# Patient Record
Sex: MALE | Race: WHITE | Employment: OTHER | URBAN - NONMETROPOLITAN AREA
[De-identification: names, ages, dates, MRNs, and addresses within clinical notes are randomized per-mention and may not be internally consistent; named-entity substitution may affect disease eponyms.]

---

## 2020-06-15 ENCOUNTER — OFFICE VISIT (OUTPATIENT)
Dept: OPTOMETRY | Age: 60
End: 2020-06-15
Payer: MEDICARE

## 2020-06-15 PROCEDURE — 99203 OFFICE O/P NEW LOW 30 MIN: CPT | Performed by: OPTOMETRIST

## 2020-06-15 PROCEDURE — 92250 FUNDUS PHOTOGRAPHY W/I&R: CPT | Performed by: OPTOMETRIST

## 2020-06-15 PROCEDURE — 2024F 7 FLD RTA PHOTO EVC RTNOPTHY: CPT | Performed by: OPTOMETRIST

## 2020-06-15 PROCEDURE — 4004F PT TOBACCO SCREEN RCVD TLK: CPT | Performed by: OPTOMETRIST

## 2020-06-15 PROCEDURE — 3046F HEMOGLOBIN A1C LEVEL >9.0%: CPT | Performed by: OPTOMETRIST

## 2020-06-15 PROCEDURE — G8421 BMI NOT CALCULATED: HCPCS | Performed by: OPTOMETRIST

## 2020-06-15 PROCEDURE — G8427 DOCREV CUR MEDS BY ELIG CLIN: HCPCS | Performed by: OPTOMETRIST

## 2020-06-15 PROCEDURE — 3017F COLORECTAL CA SCREEN DOC REV: CPT | Performed by: OPTOMETRIST

## 2020-06-15 RX ORDER — LISINOPRIL 40 MG/1
TABLET ORAL
COMMUNITY

## 2020-06-15 RX ORDER — ASPIRIN 81 MG/1
81 TABLET ORAL DAILY
COMMUNITY
Start: 2020-01-08 | End: 2021-01-07

## 2020-06-15 RX ORDER — LAMOTRIGINE 200 MG/1
TABLET ORAL
COMMUNITY

## 2020-06-15 RX ORDER — VENLAFAXINE HYDROCHLORIDE 150 MG/1
CAPSULE, EXTENDED RELEASE ORAL
COMMUNITY
Start: 2018-04-12

## 2020-06-15 RX ORDER — NAPROXEN 500 MG/1
TABLET ORAL
COMMUNITY
Start: 2019-06-26

## 2020-06-15 RX ORDER — ASCORBIC ACID 500 MG
500 TABLET ORAL DAILY
COMMUNITY

## 2020-06-15 RX ORDER — ALBUTEROL SULFATE 90 UG/1
AEROSOL, METERED RESPIRATORY (INHALATION)
COMMUNITY
Start: 2019-09-19

## 2020-06-15 RX ORDER — ACETAMINOPHEN 500 MG
TABLET ORAL
COMMUNITY

## 2020-06-15 RX ORDER — ALLOPURINOL 300 MG/1
TABLET ORAL
COMMUNITY

## 2020-06-15 RX ORDER — TROPICAMIDE 10 MG/ML
1 SOLUTION/ DROPS OPHTHALMIC ONCE
Status: COMPLETED | OUTPATIENT
Start: 2020-06-15 | End: 2020-06-15

## 2020-06-15 RX ORDER — VENLAFAXINE HYDROCHLORIDE 150 MG/1
CAPSULE, EXTENDED RELEASE ORAL
COMMUNITY

## 2020-06-15 RX ORDER — BUDESONIDE AND FORMOTEROL FUMARATE DIHYDRATE 160; 4.5 UG/1; UG/1
AEROSOL RESPIRATORY (INHALATION)
COMMUNITY
Start: 2019-09-13

## 2020-06-15 RX ORDER — OMEPRAZOLE 20 MG/1
CAPSULE, DELAYED RELEASE ORAL
COMMUNITY

## 2020-06-15 RX ORDER — AMLODIPINE BESYLATE 5 MG/1
TABLET ORAL
COMMUNITY
Start: 2020-05-31

## 2020-06-15 RX ADMIN — TROPICAMIDE 1 DROP: 10 SOLUTION/ DROPS OPHTHALMIC at 10:25

## 2020-06-15 ASSESSMENT — VISUAL ACUITY
OD_SC: 20/40 OU
OD_SC: 20/80
OD_SC+: -2
METHOD: SNELLEN - LINEAR
OS_SC: 20/50

## 2020-06-15 ASSESSMENT — KERATOMETRY
OD_K2POWER_DIOPTERS: 43.50
OD_AXISANGLE_DEGREES: 090
OS_K2POWER_DIOPTERS: 44.00
OS_K1POWER_DIOPTERS: 44.00
OD_K1POWER_DIOPTERS: 43.50
OS_AXISANGLE2_DEGREES: 000
OD_AXISANGLE2_DEGREES: 000
OS_AXISANGLE_DEGREES: 090

## 2020-06-15 ASSESSMENT — TONOMETRY
OS_IOP_MMHG: 21
IOP_METHOD: NON-CONTACT AIR PUFF
OD_IOP_MMHG: 22

## 2020-06-15 ASSESSMENT — REFRACTION_MANIFEST
OS_CYLINDER: -1.00
OD_SPHERE: -1.00
OS_SPHERE: -0.25
OD_CYLINDER: -0.50
OS_AXIS: 095
OD_AXIS: 035

## 2020-06-15 ASSESSMENT — REFRACTION_WEARINGRX
SPECS_TYPE: BIFOCAL
OD_SPHERE: NOT WITH

## 2020-06-15 ASSESSMENT — SLIT LAMP EXAM - LIDS
COMMENTS: NORMAL
COMMENTS: NORMAL

## 2020-06-15 ASSESSMENT — ENCOUNTER SYMPTOMS
GASTROINTESTINAL NEGATIVE: 0
RESPIRATORY NEGATIVE: 1
ALLERGIC/IMMUNOLOGIC NEGATIVE: 0
EYES NEGATIVE: 0

## 2020-06-15 NOTE — PROGRESS NOTES
Gastrointestinal, Neurological, Skin, Musculoskeletal, HENT, Endocrine, Cardiovascular, Eyes, Psychiatric, Allergic/Imm, Heme/Lymph         Family History   Problem Relation Age of Onset    Cataracts Mother     Diabetes Neg Hx     Glaucoma Neg Hx      Social History     Socioeconomic History    Marital status:      Spouse name: None    Number of children: None    Years of education: None    Highest education level: None   Tobacco Use    Smoking status: Every Day    Smokeless tobacco: Never       History reviewed. No pertinent past medical history.       Main Ophthalmology Exam       External Exam         Right Left    External Normal Normal              Slit Lamp Exam         Right Left    Lids/Lashes Normal Normal    Conjunctiva/Sclera White and quiet White and quiet    Cornea Clear Clear    Anterior Chamber Deep and quiet Deep and quiet    Iris Round and reactive Round and reactive    Lens 2+ Posterior subcapsular cataract 1+ Posterior subcapsular cataract    Vitreous Normal Normal              Fundus Exam         Right Left    Disc Normal Normal    C/D Ratio 0.25 0.25    Macula Normal Normal    Vessels Normal Normal    78 diopter                    <div id=\"MAIN_EXAM_REVIEWED\"></div>     Tonometry       Tonometry (Non-contact air puff, 9:34 AM)         Right Left    Pressure 22 21   IOP.8              24.0  CH:  12.1          12.3  WS: 4.4          5.3                     Visual Acuity (Snellen - Linear)         Right Left    Dist sc 20/80 -2 20/50    Near sc 20/40 OU           Keratometry       Keratometry         K1 Axis K2 Axis    Right 43.50 000 43.50 090    Left 44.00 000 44.00 090                  Pupils       Pupils         Pupils    Right PERRL    Left PERRL                  Not recorded       Not recorded         Ophthalmology Exam       Wearing Rx         Sphere    Right not with     Left       Age: 4yrs    Type: Bifocal                    Manifest Refraction       Manifest Refraction

## 2022-08-25 ENCOUNTER — OFFICE VISIT (OUTPATIENT)
Dept: OPTOMETRY | Age: 62
End: 2022-08-25
Payer: COMMERCIAL

## 2022-08-25 DIAGNOSIS — H52.4 MYOPIA OF BOTH EYES WITH ASTIGMATISM AND PRESBYOPIA: ICD-10-CM

## 2022-08-25 DIAGNOSIS — E11.9 NON-INSULIN DEPENDENT TYPE 2 DIABETES MELLITUS (HCC): Primary | ICD-10-CM

## 2022-08-25 DIAGNOSIS — H53.8 BLURRED VISION: ICD-10-CM

## 2022-08-25 DIAGNOSIS — H52.13 MYOPIA OF BOTH EYES WITH ASTIGMATISM AND PRESBYOPIA: ICD-10-CM

## 2022-08-25 DIAGNOSIS — H52.203 MYOPIA OF BOTH EYES WITH ASTIGMATISM AND PRESBYOPIA: ICD-10-CM

## 2022-08-25 DIAGNOSIS — H25.813 COMBINED FORMS OF AGE-RELATED CATARACT OF BOTH EYES: ICD-10-CM

## 2022-08-25 PROCEDURE — 99214 OFFICE O/P EST MOD 30 MIN: CPT | Performed by: OPTOMETRIST

## 2022-08-25 PROCEDURE — 99211 OFF/OP EST MAY X REQ PHY/QHP: CPT | Performed by: OPTOMETRIST

## 2022-08-25 PROCEDURE — 99211 OFF/OP EST MAY X REQ PHY/QHP: CPT

## 2022-08-25 PROCEDURE — 92250 FUNDUS PHOTOGRAPHY W/I&R: CPT | Performed by: OPTOMETRIST

## 2022-08-25 RX ORDER — TROPICAMIDE 10 MG/ML
1 SOLUTION/ DROPS OPHTHALMIC ONCE
Status: COMPLETED | OUTPATIENT
Start: 2022-08-25 | End: 2022-08-25

## 2022-08-25 RX ADMIN — TROPICAMIDE 1 DROP: 10 SOLUTION/ DROPS OPHTHALMIC at 09:02

## 2022-08-25 ASSESSMENT — REFRACTION_WEARINGRX
OD_AXIS: 035
OD_SPHERE: -1.00
OD_ADD: +2.25
OS_AXIS: 095
OS_ADD: +2.25
SPECS_TYPE: BIFOCAL
OS_CYLINDER: -1.00
OD_CYLINDER: -0.50
OS_SPHERE: -0.25

## 2022-08-25 ASSESSMENT — REFRACTION_MANIFEST
OS_CYLINDER: -1.00
OS_ADD: +2.25
OS_SPHERE: PLANO
OD_CYLINDER: -0.50
OS_AXIS: 090
OD_AXIS: 030
OD_SPHERE: -3.50
OD_ADD: +2.25
OD_ADD: +2.50
OS_ADD: +2.50

## 2022-08-25 ASSESSMENT — KERATOMETRY
METHOD_AUTO_MANUAL: AUTOMATED
OS_AXISANGLE_DEGREES: 090
OD_K1POWER_DIOPTERS: 43.50
OD_AXISANGLE2_DEGREES: 000
OS_K2POWER_DIOPTERS: 44.00
OD_K2POWER_DIOPTERS: 43.50
OS_AXISANGLE2_DEGREES: 000
OS_K1POWER_DIOPTERS: 44.00
OD_AXISANGLE_DEGREES: 090

## 2022-08-25 ASSESSMENT — VISUAL ACUITY
OS_CC: 20/50
CORRECTION_TYPE: GLASSES
METHOD: SNELLEN - LINEAR

## 2022-08-25 ASSESSMENT — SLIT LAMP EXAM - LIDS
COMMENTS: NORMAL
COMMENTS: NORMAL

## 2022-08-25 ASSESSMENT — TONOMETRY
OD_IOP_MMHG: 17
IOP_METHOD: NON-CONTACT AIR PUFF
OS_IOP_MMHG: 16

## 2022-08-25 NOTE — PROGRESS NOTES
Chris Alicea presents today for   Chief Complaint   Patient presents with    Ophth Diabetic Exam    Blurred Vision   . HPI       Blurred Vision              Laterality: both eyes    Quality: blurred    Context: distance vision and near vision              Comments    Last Vision Exam: 6/15/20  Last Ophthalmology Exam: n/a  Last Filled Glasses Rx: 6/15/20  Insurance: Medicare    Update: DM Exam, update glasses, vision is blurry in the distance and near  Diabetic:  Sugars:  doesn't check at home  HmgA1C: 5.7                    Current Outpatient Medications   Medication Sig Dispense Refill    albuterol sulfate  (90 Base) MCG/ACT inhaler 2 puffs as needed      allopurinol (ZYLOPRIM) 300 MG tablet 1 tablet      ascorbic acid (VITAMIN C) 500 MG tablet Take 500 mg by mouth daily      aspirin (ASPIRIN 81) 81 MG EC tablet Take 81 mg by mouth daily      ciclopirox (LOPROX) 1.27 % cream 1 application to affected area      venlafaxine (EFFEXOR XR) 150 MG extended release capsule 1 tablet      lamoTRIgine (LAMICTAL) 200 MG tablet 1 tablet      lurasidone (LATUDA) 40 MG TABS tablet 1 tablet      lisinopril (PRINIVIL;ZESTRIL) 40 MG tablet 1 tablet      naproxen (NAPROSYN) 500 MG tablet 1 tablet with food or milk as needed      omeprazole (PRILOSEC) 20 MG delayed release capsule TAKE 1 CAPSULE ONCE DAILY      Simvastatin 40 MG/5ML SUSP 1 tablet in the evening      acetaminophen (TYLENOL) 500 MG tablet 1-2 tablets      venlafaxine (EFFEXOR XR) 150 MG extended release capsule take 1 capsule by mouth twice a day      budesonide-formoterol (SYMBICORT) 160-4.5 MCG/ACT AERO 2 puffs      amLODIPine (NORVASC) 5 MG tablet take 1 tablet by mouth daily       No current facility-administered medications for this visit.          Family History   Problem Relation Age of Onset    Cataracts Mother     Diabetes Neg Hx     Glaucoma Neg Hx      Social History     Socioeconomic History    Marital status:      Spouse name: None Number of children: None    Years of education: None    Highest education level: None   Tobacco Use    Smoking status: Every Day    Smokeless tobacco: Never       History reviewed. No pertinent past medical history.       Main Ophthalmology Exam       External Exam         Right Left    External Normal Normal              Slit Lamp Exam         Right Left    Lids/Lashes Normal Normal    Conjunctiva/Sclera White and quiet White and quiet    Cornea Clear Clear    Anterior Chamber Deep and quiet Deep and quiet    Iris Round and reactive Round and reactive    Lens 2+ Posterior subcapsular cataract, 3+ Nuclear sclerosis 1+ Posterior subcapsular cataract, 1+ Nuclear sclerosis    Vitreous Normal Normal              Fundus Exam         Right Left    Disc Normal Normal    C/D Ratio 0.25 0.25    Macula Normal Normal    Vessels Tortuous Tortuous    Periphery Normal Normal                   <div id=\"MAIN_EXAM_REVIEWED\"></div>     Tonometry       Tonometry (Non-contact air puff, 8:57 AM)         Right Left    Pressure 17 16      Right / Left  IOPg 21.2 / 19.6  CH 13.5 / 13.7  WS 8.0 / 7.4                       Visual Acuity (Snellen - Linear)         Right Left    Dist cc 20/200 20/50      Correction: Glasses          Keratometry       Keratometry (Automated)         K1 Axis K2 Axis    Right 43.50 000 43.50 090    Left 44.00 000 44.00 090                  Pupils       Pupils         Pupils    Right PERRL    Left PERRL                  Not recorded       Not recorded         Ophthalmology Exam       Wearing Rx         Sphere Cylinder Axis Add    Right -1.00 -0.50 035 +2.25    Left -0.25 -1.00 095 +2.25      Age: 3yrs    Type: Bifocal                    Manifest Refraction       Manifest Refraction         Sphere Cylinder Axis Dist VA Add    Right -3.50 -0.50 030 20/50- +2.50    Left [place] -1.00 090 20/30 +2.50              Manifest Refraction #2 (Auto)         Sphere Cylinder Axis Dist VA Add    Right -3.50 -0.75 021      Left +0.25 -0.75 086                     Final Rx         Sphere Cylinder Axis Dist VA Add    Right -3.50 -0.50 030 20/50- +2.50    Left [place] -1.00 090 20/30 +2.50      Type: Bifocal    Expiration Date: 8/25/2024            Neuro/Psych       Neuro/Psych       Oriented x3: Yes    Mood/Affect: Normal                    Orders Placed This Encounter   Procedures    Amb External Referral To Ophthalmology     Referral Priority:   Routine     Referral Reason:   Specialty Services Required     Requested Specialty:   Ophthalmology     Number of Visits Requested:   1     DIABETES EYE EXAM    Color Fundus Photography-OU-Both Eyes   No diabetic retinopathy     IMPRESSION:  1. Non-insulin dependent type 2 diabetes mellitus ([address]    2. Myopia of both eyes with astigmatism and presbyopia    3. Combined forms of age-related cataract of both eyes    4. Blurred vision        PLAN:    Discussed the patient's diagnosis of diabetes and the impact this can have on their ocular health, potentially even leading to permanent blindness. I discussed with the patient the importance of continued follow-up and management with their primary care physician to control their glycemic, blood pressure, and lipid levels. The patient verbalized understanding. New glasses recommended  Referral for cataract evaluation OD>> OS         Glycemic control as per PCP   There are no Patient Instructions on file for this visit. Return for referral for cataract evaluation OD>> OS .

## 2023-09-25 ENCOUNTER — TELEPHONE (OUTPATIENT)
Dept: PRIMARY CARE CLINIC | Age: 63
End: 2023-09-25

## 2023-10-06 ENCOUNTER — TELEPHONE (OUTPATIENT)
Dept: PRIMARY CARE CLINIC | Age: 63
End: 2023-10-06

## 2023-10-06 NOTE — TELEPHONE ENCOUNTER
Patient saw Akbar Estrada in [address]. Is having issues getting his Trulicity. Due to  not starting yet in the office and being out of town. Patient was instructed to call his old office for further assistance.

## 2023-11-02 RX ORDER — CLOPIDOGREL BISULFATE 75 MG/1
75 TABLET ORAL DAILY
Qty: 90 TABLET | Refills: 3 | Status: SHIPPED | OUTPATIENT
Start: 2023-11-02

## 2023-11-02 NOTE — TELEPHONE ENCOUNTER
Pt is scheduled with NB at [facility] and is requesting a refill of his medication - unsure of dosage. He is completely out of this and would like the refill to go to the [employer] in [facility].      Last Visit Date: Visit date not found   Next Visit Date: 11/6/2023

## 2023-11-06 ENCOUNTER — OFFICE VISIT (OUTPATIENT)
Dept: PRIMARY CARE CLINIC | Age: 63
End: 2023-11-06
Payer: MEDICARE

## 2023-11-06 VITALS
SYSTOLIC BLOOD PRESSURE: 112 MMHG | WEIGHT: 207.4 LBS | HEIGHT: 67 IN | OXYGEN SATURATION: 95 % | DIASTOLIC BLOOD PRESSURE: 68 MMHG | BODY MASS INDEX: 32.55 KG/M2 | HEART RATE: 95 BPM

## 2023-11-06 DIAGNOSIS — R39.12 BENIGN PROSTATIC HYPERPLASIA WITH WEAK URINARY STREAM: ICD-10-CM

## 2023-11-06 DIAGNOSIS — G25.0 ESSENTIAL TREMOR: ICD-10-CM

## 2023-11-06 DIAGNOSIS — I73.9 PERIPHERAL ARTERIAL DISEASE (HCC): ICD-10-CM

## 2023-11-06 DIAGNOSIS — E11.649 TYPE 2 DIABETES MELLITUS WITH HYPOGLYCEMIA WITHOUT COMA, WITHOUT LONG-TERM CURRENT USE OF INSULIN (HCC): ICD-10-CM

## 2023-11-06 DIAGNOSIS — N39.41 URGE INCONTINENCE: ICD-10-CM

## 2023-11-06 DIAGNOSIS — J43.9 PULMONARY EMPHYSEMA, UNSPECIFIED EMPHYSEMA TYPE (HCC): Primary | ICD-10-CM

## 2023-11-06 DIAGNOSIS — F17.211 CIGARETTE NICOTINE DEPENDENCE IN REMISSION: ICD-10-CM

## 2023-11-06 DIAGNOSIS — E11.36 TYPE 2 DIABETES MELLITUS WITH DIABETIC CATARACT, WITHOUT LONG-TERM CURRENT USE OF INSULIN (HCC): ICD-10-CM

## 2023-11-06 DIAGNOSIS — J96.11 CHRONIC RESPIRATORY FAILURE WITH HYPOXIA (HCC): ICD-10-CM

## 2023-11-06 DIAGNOSIS — J45.40 MODERATE PERSISTENT ASTHMA WITHOUT COMPLICATION: ICD-10-CM

## 2023-11-06 DIAGNOSIS — F10.21 ALCOHOL DEPENDENCE IN REMISSION (HCC): ICD-10-CM

## 2023-11-06 DIAGNOSIS — G25.81 RESTLESS LEG SYNDROME: ICD-10-CM

## 2023-11-06 DIAGNOSIS — N40.1 BENIGN PROSTATIC HYPERPLASIA WITH WEAK URINARY STREAM: ICD-10-CM

## 2023-11-06 DIAGNOSIS — J30.1 SEASONAL ALLERGIC RHINITIS DUE TO POLLEN: ICD-10-CM

## 2023-11-06 PROBLEM — J44.9 COPD (CHRONIC OBSTRUCTIVE PULMONARY DISEASE) (HCC): Status: ACTIVE | Noted: 2023-11-06

## 2023-11-06 PROBLEM — F43.10 POSTTRAUMATIC STRESS DISORDER: Status: ACTIVE | Noted: 2020-11-05

## 2023-11-06 PROBLEM — K21.9 GASTROESOPHAGEAL REFLUX DISEASE WITHOUT ESOPHAGITIS: Status: ACTIVE | Noted: 2020-01-08

## 2023-11-06 PROBLEM — E87.1 HYPO-OSMOLAR HYPONATREMIA: Status: ACTIVE | Noted: 2023-09-06

## 2023-11-06 PROBLEM — E11.29 TYPE 2 DIABETES MELLITUS WITH OTHER DIABETIC KIDNEY COMPLICATION (HCC): Status: ACTIVE | Noted: 2023-01-11

## 2023-11-06 PROBLEM — F31.9 BIPOLAR 1 DISORDER (HCC): Status: ACTIVE | Noted: 2020-01-08

## 2023-11-06 PROBLEM — M15.0 PRIMARY OSTEOARTHRITIS INVOLVING MULTIPLE JOINTS: Status: ACTIVE | Noted: 2020-04-14

## 2023-11-06 PROBLEM — E83.52 HYPERCALCEMIA: Status: ACTIVE | Noted: 2023-07-24

## 2023-11-06 PROBLEM — M25.512 PAIN IN LEFT SHOULDER: Status: ACTIVE | Noted: 2023-02-10

## 2023-11-06 PROBLEM — R35.0 FREQUENCY OF MICTURITION: Status: ACTIVE | Noted: 2023-04-10

## 2023-11-06 PROBLEM — J45.909 ASTHMA: Status: ACTIVE | Noted: 2023-11-06

## 2023-11-06 PROBLEM — E78.2 MIXED HYPERLIPIDEMIA: Status: ACTIVE | Noted: 2023-01-11

## 2023-11-06 PROBLEM — H04.129 DRY EYE: Status: ACTIVE | Noted: 2022-12-15

## 2023-11-06 PROBLEM — J96.01 ACUTE RESPIRATORY FAILURE WITH HYPOXIA (HCC): Status: ACTIVE | Noted: 2023-06-16

## 2023-11-06 PROBLEM — M10.9 GOUT: Status: ACTIVE | Noted: 2020-11-19

## 2023-11-06 PROBLEM — R06.09 DYSPNEA ON EXERTION: Status: ACTIVE | Noted: 2023-01-11

## 2023-11-06 PROBLEM — S22.49XA CLOSED FRACTURE OF MULTIPLE RIBS: Status: ACTIVE | Noted: 2018-06-12

## 2023-11-06 PROBLEM — H35.033 BILATERAL HYPERTENSIVE RETINOPATHY: Status: ACTIVE | Noted: 2022-12-15

## 2023-11-06 PROBLEM — F17.210 NICOTINE DEPENDENCE, CIGARETTES, UNCOMPLICATED: Status: ACTIVE | Noted: 2023-06-15

## 2023-11-06 PROBLEM — M15.9 PRIMARY OSTEOARTHRITIS INVOLVING MULTIPLE JOINTS: Status: ACTIVE | Noted: 2020-04-14

## 2023-11-06 PROBLEM — H25.813 COMBINED FORMS OF AGE-RELATED CATARACT OF BOTH EYES: Status: ACTIVE | Noted: 2022-12-15

## 2023-11-06 LAB — HBA1C MFR BLD: 5.6 %

## 2023-11-06 PROCEDURE — 83036 HEMOGLOBIN GLYCOSYLATED A1C: CPT | Performed by: STUDENT IN AN ORGANIZED HEALTH CARE EDUCATION/TRAINING PROGRAM

## 2023-11-06 PROCEDURE — 99205 OFFICE O/P NEW HI 60 MIN: CPT | Performed by: STUDENT IN AN ORGANIZED HEALTH CARE EDUCATION/TRAINING PROGRAM

## 2023-11-06 PROCEDURE — 3078F DIAST BP <80 MM HG: CPT | Performed by: STUDENT IN AN ORGANIZED HEALTH CARE EDUCATION/TRAINING PROGRAM

## 2023-11-06 PROCEDURE — 3044F HG A1C LEVEL LT 7.0%: CPT | Performed by: STUDENT IN AN ORGANIZED HEALTH CARE EDUCATION/TRAINING PROGRAM

## 2023-11-06 PROCEDURE — 3074F SYST BP LT 130 MM HG: CPT | Performed by: STUDENT IN AN ORGANIZED HEALTH CARE EDUCATION/TRAINING PROGRAM

## 2023-11-06 RX ORDER — ROPINIROLE 2 MG/1
2 TABLET, FILM COATED ORAL
COMMUNITY
Start: 2023-10-15

## 2023-11-06 RX ORDER — FLUTICASONE FUROATE, UMECLIDINIUM BROMIDE AND VILANTEROL TRIFENATATE 200; 62.5; 25 UG/1; UG/1; UG/1
1 POWDER RESPIRATORY (INHALATION) DAILY
Qty: 60 EACH | Refills: 5 | Status: SHIPPED | OUTPATIENT
Start: 2023-11-06

## 2023-11-06 RX ORDER — FLUTICASONE PROPIONATE 50 MCG
1 SPRAY, SUSPENSION (ML) NASAL DAILY
Qty: 32 G | Refills: 1 | Status: SHIPPED | OUTPATIENT
Start: 2023-11-06

## 2023-11-06 RX ORDER — DULAGLUTIDE 0.75 MG/.5ML
INJECTION, SOLUTION SUBCUTANEOUS
COMMUNITY
Start: 2023-10-06 | End: 2023-11-09 | Stop reason: SDUPTHER

## 2023-11-06 RX ORDER — TAMSULOSIN HYDROCHLORIDE 0.4 MG/1
0.4 CAPSULE ORAL DAILY
COMMUNITY
Start: 2022-12-14

## 2023-11-06 RX ORDER — OXYBUTYNIN CHLORIDE 5 MG/1
TABLET, EXTENDED RELEASE ORAL
COMMUNITY
Start: 2023-05-03 | End: 2023-11-06

## 2023-11-06 RX ORDER — OLANZAPINE 7.5 MG/1
7.5 TABLET, FILM COATED ORAL DAILY
COMMUNITY
Start: 2023-10-26 | End: 2023-11-06

## 2023-11-06 RX ORDER — OXYBUTYNIN CHLORIDE 5 MG/1
5 TABLET, EXTENDED RELEASE ORAL DAILY
COMMUNITY
Start: 2023-10-16

## 2023-11-06 RX ORDER — FLUTICASONE FUROATE, UMECLIDINIUM BROMIDE AND VILANTEROL TRIFENATATE 200; 62.5; 25 UG/1; UG/1; UG/1
POWDER RESPIRATORY (INHALATION)
COMMUNITY
Start: 2023-10-11 | End: 2023-11-06 | Stop reason: SDUPTHER

## 2023-11-06 SDOH — ECONOMIC STABILITY: INCOME INSECURITY: HOW HARD IS IT FOR YOU TO PAY FOR THE VERY BASICS LIKE FOOD, HOUSING, MEDICAL CARE, AND HEATING?: NOT HARD AT ALL

## 2023-11-06 SDOH — ECONOMIC STABILITY: HOUSING INSECURITY
IN THE LAST 12 MONTHS, WAS THERE A TIME WHEN YOU DID NOT HAVE A STEADY PLACE TO SLEEP OR SLEPT IN A SHELTER (INCLUDING NOW)?: NO

## 2023-11-06 SDOH — ECONOMIC STABILITY: FOOD INSECURITY: WITHIN THE PAST 12 MONTHS, THE FOOD YOU BOUGHT JUST DIDN'T LAST AND YOU DIDN'T HAVE MONEY TO GET MORE.: NEVER TRUE

## 2023-11-06 SDOH — ECONOMIC STABILITY: FOOD INSECURITY: WITHIN THE PAST 12 MONTHS, YOU WORRIED THAT YOUR FOOD WOULD RUN OUT BEFORE YOU GOT MONEY TO BUY MORE.: NEVER TRUE

## 2023-11-06 ASSESSMENT — PATIENT HEALTH QUESTIONNAIRE - PHQ9
SUM OF ALL RESPONSES TO PHQ QUESTIONS 1-9: 0
2. FEELING DOWN, DEPRESSED OR HOPELESS: 0
SUM OF ALL RESPONSES TO PHQ9 QUESTIONS 1 & 2: 0
SUM OF ALL RESPONSES TO PHQ QUESTIONS 1-9: 0
1. LITTLE INTEREST OR PLEASURE IN DOING THINGS: 0

## 2023-11-06 NOTE — PROGRESS NOTES
[facility]      Date of Visit:  2023  Patient Name: Dilshad Ley   Patient :  1960     CHIEF COMPLAINT:     Dilshad Ley is a 59 y.o. male who presents today to be evaluated for the following condition(s):  Chief Complaint   Patient presents with    Hypertension     Pt is taking amlodipine 5mg and lisinopril 40mg daily - pt does not check his BP at home. Denies chest pain, swelling. Admits there is some SOB, but he is on oxygen at home due to the COPD and asthma. Diabetes     Pt is taking Metformin 189OY BID and Trulicity - pt has a continuous glucose monitor, avg is around 150. No concerns. Pt needs an a1c. Other     Pt c/o shaking in his hands that is worsening. He states that it was in his L only, but is now in his R as well. HISTORY OF PRESENT ILLNESS:      HPI   Patient is a new patient to our office who is presenting today to establish care. Patient is known to me from my previous practice at [facility]. Type 2 diabetes  Overall, patient reports he is doing well regards to his blood glucose. Estimated fasting blood glucose is averaging around 110; overall average blood glucose around 150. Patient will experience occasional hypoglycemia in 70's, lowest 67 (symptomatic but responds with eating, not requiring medical intervention). Patient will adjust his metformin dosing; if he experiences hypoglycemia, he will back off to 500 mg once daily for a couple of days before resuming previous dose. HbgA1c is in the normal range today at 5.6; previously approximately 7.2. Patient is very happy regarding his glycemic control; he was previously scheduled to undergo bilateral cataract surgery with Dr. Chet Cody that had to be postponed secondary to marked hyperglycemia. Patient had been managed with diet alone for years until he abruptly changed his diet; HbgA1c > 10.0 earlier this year.   He was consuming excessive amounts of junk food, sweetened ice tea which

## 2023-11-09 RX ORDER — DULAGLUTIDE 0.75 MG/.5ML
INJECTION, SOLUTION SUBCUTANEOUS
Qty: 4 ADJUSTABLE DOSE PRE-FILLED PEN SYRINGE | Refills: 3 | Status: SHIPPED | OUTPATIENT
Start: 2023-11-09

## 2023-11-09 NOTE — TELEPHONE ENCOUNTER
Kisha Bishop is requesting a refill on the following medication(s):  Requested Prescriptions     Pending Prescriptions Disp Refills    TRULICITY 3.46 OI/0.5WV SOPN 4 Adjustable Dose Pre-filled Pen Syringe 3     Sig: inject 0.5 milliliters ( 0.75 milligrams ) subcutaneously every 7...  (REFER TO PRESCRIPTION NOTES).        Last Visit Date (If Applicable):  06/4/7077    Next Visit Date:    2/6/2024

## 2023-11-13 ENCOUNTER — TELEPHONE (OUTPATIENT)
Dept: PRIMARY CARE CLINIC | Age: 63
End: 2023-11-13

## 2023-11-13 NOTE — TELEPHONE ENCOUNTER
LMTCB for [facility] with Dr. Sj Barraza office re last office note and a1c results.

## 2023-11-13 NOTE — TELEPHONE ENCOUNTER
Pt called asking if PCP has sent over notes with latest A1c results to Dr Tessie Das so he will be able to be approved to have his cataract surgery done which has been cancelled twice . Please advise. Thank you.

## 2023-11-15 ENCOUNTER — TELEPHONE (OUTPATIENT)
Dept: PRIMARY CARE CLINIC | Age: 63
End: 2023-11-15

## 2023-11-15 NOTE — TELEPHONE ENCOUNTER
Patient called in this morning stating his BS have been in the 200 range. Writer look in last off note on 11/06/2023. The note stated \" Overall, patient reports he is doing well regards to his blood glucose. Estimated fasting blood glucose is averaging around 110; overall average blood glucose around 150. Patient will experience occasional hypoglycemia in 70's, lowest 67 (symptomatic but responds with eating, not requiring medical intervention). Patient will adjust his metformin dosing; if he experiences hypoglycemia, he will back off to 500 mg once daily for a couple of days before resuming previous dose. HbgA1c is in the normal range today at 5.6; previously approximately 7. 2. \" Patient was instructed again on the medication adjustment. Patient was told to call office if he continues to have high or low BS.

## 2023-11-20 ENCOUNTER — CLINICAL DOCUMENTATION (OUTPATIENT)
Dept: PRIMARY CARE CLINIC | Age: 63
End: 2023-11-20

## 2023-11-20 ASSESSMENT — ENCOUNTER SYMPTOMS
BLOOD IN STOOL: 0
WHEEZING: 0
VOMITING: 0
SHORTNESS OF BREATH: 1
SINUS PRESSURE: 1
RHINORRHEA: 1
ABDOMINAL PAIN: 0
NAUSEA: 0
SORE THROAT: 0
DIARRHEA: 0
COUGH: 1

## 2023-11-20 NOTE — ASSESSMENT & PLAN NOTE
Stable; continue Trelegy daily, albuterol PRN. Patient to continue to follow with Dr. Flip Goldsmith as scheduled. He is amenable to trial of pulmonary rehab; order placed today.

## 2023-11-20 NOTE — PROGRESS NOTES
Patient called back in stating that he was feeling much better. The shortness of breath \"came and went as fast as it came\", he us back to breathing normal again and the difficulty he had was just temporary.  I advised patient to reach out if symptoms worsened or returned and patient verbalized understanding

## 2023-11-20 NOTE — ASSESSMENT & PLAN NOTE
Well controlled; continue tamsulosin. 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 Prashant Grove.

## 2023-11-20 NOTE — PROGRESS NOTES
Spoke with patient today regarding BG numbers based on previous encounter from 11/15/23 (which I was not aware of until accessing his chart for other reasons today). His BG has not changed from previous; BG transiently as high as 200 immediately after eating but below goal of <180 two hours post-prandially. We had decreased patient's metformin to once daily dosing at last office visit due to A1c of 5.6 with frequent low normal/low fasting BG of 70. Aside from the above, patient reports that starting this morning, he felt more short of breath than usual.  He had just administered albuterol prior to call and was doing better. No increased cough, sputum production over the weekend. He was using home O2 with O2 sat at 97%. Recommended he come in to be seen today; declines but will update us on Wednesday or sooner if feeling worse or having to use albuterol regularly. Assuming patient is doing well on Wednesday, will draft letter to Dr. Klaudia Huang stating that patient may proceed with cataract surgery.

## 2023-11-21 DIAGNOSIS — E11.649 TYPE 2 DIABETES MELLITUS WITH HYPOGLYCEMIA WITHOUT COMA, WITHOUT LONG-TERM CURRENT USE OF INSULIN (HCC): ICD-10-CM

## 2023-12-07 ENCOUNTER — TELEPHONE (OUTPATIENT)
Dept: PRIMARY CARE CLINIC | Age: 63
End: 2023-12-07

## 2023-12-07 NOTE — TELEPHONE ENCOUNTER
Pt called stating he needs a clearance letter to be sent to [facility]. He is scheduled for Cataract surgery on 12/19/23 and needs the letter before then. It will go to [facility].

## 2023-12-11 ENCOUNTER — HOSPITAL ENCOUNTER (OUTPATIENT)
Age: 63
Setting detail: SPECIMEN
Discharge: HOME | End: 2023-12-11

## 2023-12-11 ENCOUNTER — OFFICE VISIT (OUTPATIENT)
Dept: PRIMARY CARE CLINIC | Age: 63
End: 2023-12-11
Payer: MEDICARE

## 2023-12-11 VITALS
DIASTOLIC BLOOD PRESSURE: 70 MMHG | BODY MASS INDEX: 32.65 KG/M2 | HEIGHT: 67 IN | WEIGHT: 208 LBS | OXYGEN SATURATION: 95 % | HEART RATE: 101 BPM | SYSTOLIC BLOOD PRESSURE: 124 MMHG

## 2023-12-11 DIAGNOSIS — E78.2 MIXED HYPERLIPIDEMIA: ICD-10-CM

## 2023-12-11 DIAGNOSIS — Z01.818 PREOPERATIVE EXAMINATION: Primary | ICD-10-CM

## 2023-12-11 DIAGNOSIS — J96.11 CHRONIC RESPIRATORY FAILURE WITH HYPOXIA (HCC): ICD-10-CM

## 2023-12-11 DIAGNOSIS — I10 ESSENTIAL HYPERTENSION: ICD-10-CM

## 2023-12-11 DIAGNOSIS — G25.0 ESSENTIAL TREMOR: ICD-10-CM

## 2023-12-11 DIAGNOSIS — J45.40 MODERATE PERSISTENT ASTHMA WITHOUT COMPLICATION: ICD-10-CM

## 2023-12-11 DIAGNOSIS — H25.813 COMBINED FORMS OF AGE-RELATED CATARACT OF BOTH EYES: ICD-10-CM

## 2023-12-11 DIAGNOSIS — J43.9 PULMONARY EMPHYSEMA, UNSPECIFIED EMPHYSEMA TYPE (HCC): ICD-10-CM

## 2023-12-11 DIAGNOSIS — R39.12 BENIGN PROSTATIC HYPERPLASIA WITH WEAK URINARY STREAM: ICD-10-CM

## 2023-12-11 DIAGNOSIS — E11.649 TYPE 2 DIABETES MELLITUS WITH HYPOGLYCEMIA WITHOUT COMA, WITHOUT LONG-TERM CURRENT USE OF INSULIN (HCC): ICD-10-CM

## 2023-12-11 DIAGNOSIS — G25.81 RESTLESS LEG SYNDROME: ICD-10-CM

## 2023-12-11 DIAGNOSIS — N40.1 BENIGN PROSTATIC HYPERPLASIA WITH WEAK URINARY STREAM: ICD-10-CM

## 2023-12-11 LAB
ALBUMIN SERPL-MCNC: 4.5 G/DL (ref 3.5–5.2)
ALBUMIN/GLOB SERPL: 1.5 {RATIO} (ref 1–2.5)
ALP SERPL-CCNC: 58 U/L (ref 40–129)
ALT SERPL-CCNC: 15 U/L (ref 5–41)
ANION GAP SERPL CALCULATED.3IONS-SCNC: 14 MMOL/L (ref 9–17)
AST SERPL-CCNC: 13 U/L
BILIRUB SERPL-MCNC: 0.2 MG/DL (ref 0.3–1.2)
BUN SERPL-MCNC: 9 MG/DL (ref 8–23)
CALCIUM SERPL-MCNC: 9.9 MG/DL (ref 8.6–10.4)
CHLORIDE SERPL-SCNC: 101 MMOL/L (ref 98–107)
CHOLEST SERPL-MCNC: 129 MG/DL
CHOLESTEROL/HDL RATIO: 2.3
CO2 SERPL-SCNC: 23 MMOL/L (ref 20–31)
CREAT SERPL-MCNC: 0.6 MG/DL (ref 0.7–1.2)
CREAT UR-MCNC: 17.6 MG/DL (ref 39–259)
GFR SERPL CREATININE-BSD FRML MDRD: >60 ML/MIN/1.73M2
GLUCOSE SERPL-MCNC: 99 MG/DL (ref 70–99)
HDLC SERPL-MCNC: 55 MG/DL
LDLC SERPL CALC-MCNC: 59 MG/DL (ref 0–130)
MICROALBUMIN UR-MCNC: 26 MG/L
MICROALBUMIN/CREAT UR-RTO: 148 MCG/MG CREAT
POTASSIUM SERPL-SCNC: 4 MMOL/L (ref 3.7–5.3)
PROT SERPL-MCNC: 7.5 G/DL (ref 6.4–8.3)
SODIUM SERPL-SCNC: 138 MMOL/L (ref 135–144)
TRIGL SERPL-MCNC: 74 MG/DL

## 2023-12-11 PROCEDURE — 99214 OFFICE O/P EST MOD 30 MIN: CPT | Performed by: STUDENT IN AN ORGANIZED HEALTH CARE EDUCATION/TRAINING PROGRAM

## 2023-12-11 PROCEDURE — 3078F DIAST BP <80 MM HG: CPT | Performed by: STUDENT IN AN ORGANIZED HEALTH CARE EDUCATION/TRAINING PROGRAM

## 2023-12-11 PROCEDURE — 93000 ELECTROCARDIOGRAM COMPLETE: CPT | Performed by: STUDENT IN AN ORGANIZED HEALTH CARE EDUCATION/TRAINING PROGRAM

## 2023-12-11 PROCEDURE — 3074F SYST BP LT 130 MM HG: CPT | Performed by: STUDENT IN AN ORGANIZED HEALTH CARE EDUCATION/TRAINING PROGRAM

## 2023-12-11 PROCEDURE — 3044F HG A1C LEVEL LT 7.0%: CPT | Performed by: STUDENT IN AN ORGANIZED HEALTH CARE EDUCATION/TRAINING PROGRAM

## 2023-12-11 RX ORDER — CILOSTAZOL 50 MG/1
50 TABLET ORAL 2 TIMES DAILY
COMMUNITY
Start: 2023-12-09

## 2023-12-11 RX ORDER — ROPINIROLE 1 MG/1
1 TABLET, FILM COATED ORAL EVERY MORNING
COMMUNITY
Start: 2023-11-30

## 2023-12-11 RX ORDER — GABAPENTIN 300 MG/1
300 CAPSULE ORAL
COMMUNITY
Start: 2023-11-24

## 2023-12-11 RX ORDER — FINASTERIDE 5 MG/1
5 TABLET, FILM COATED ORAL DAILY
COMMUNITY
Start: 2023-11-12

## 2023-12-11 RX ORDER — OLANZAPINE 7.5 MG/1
7.5 TABLET, FILM COATED ORAL NIGHTLY
COMMUNITY
Start: 2023-11-19

## 2023-12-11 RX ORDER — FAMOTIDINE 40 MG/1
40 TABLET, FILM COATED ORAL NIGHTLY PRN
COMMUNITY
Start: 2023-09-09

## 2023-12-11 ASSESSMENT — ENCOUNTER SYMPTOMS
DIARRHEA: 0
WHEEZING: 0
SHORTNESS OF BREATH: 0
COUGH: 0
VOMITING: 0
CONSTIPATION: 1
ABDOMINAL PAIN: 0
NAUSEA: 0

## 2023-12-11 NOTE — PROGRESS NOTES
[facility]      Date of Visit:  2023  Patient Name: Kisha Bishop   Patient :  1960     CHIEF COMPLAINT:     Kisha Bishop is a 59 y.o. male who presents today to be evaluated for the following condition(s):  Chief Complaint   Patient presents with    Pre-op Exam     Pt is here today for pre-op clearance prior to cataract surgery that is scheduled for 23. He will be having the R eye done. HISTORY OF PRESENT ILLNESS:      HPI   Patient is presenting today for preoperative evaluation. Patient is currently scheduled to undergo right eye cataract surgery on 2023. Patient has a significant past medical history of asthma and COPD, chronic respiratory failure with hypoxia for which he uses oxygen as needed, type 2 diabetes, hypertension, hyperlipidemia, BPH, restless leg syndrome, essential tremor, bipolar disorder. Type 2 diabetes  Patient's blood sugars excellently controlled; last hemoglobin A1c in 2023 was 5.6. Patient is no longer experiencing frequent hypoglycemia. CGM data reviewed today; excellent glycemic control. Asthma/COPD  Patient doing well; he is compliant with Trelegy; using albuterol rarely except at night right before bed prophylactically. Not requiring oxygen frequently at home. No recent increase in cough, wheezing, shortness of breath, change in sputum production or color. Recently, we have referred patient to pulmonary rehab; after leaving our office visit, he changed his mind and decided that he does not want to participate. Hypertension/hyperlipidemia  Patient denies any chest pain, palpitations, lightheadedness, dizziness, presyncope or syncope. Patient with recently normal nuclear stress test in 2023 without concerns for reversible ischemia. BPH  Symptoms well-controlled with tamsulosin. Denies any issues urinating. Essential tremor  Affects bilateral upper extremities; currently tolerable without medication.     Bipolar

## 2023-12-11 NOTE — ASSESSMENT & PLAN NOTE
Patient may proceed with planned cataract surgery. I believe he is medically optimized for his procedure. 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

## 2023-12-26 RX ORDER — SIMVASTATIN 40 MG
40 TABLET ORAL DAILY
Qty: 90 TABLET | Refills: 3 | Status: SHIPPED | OUTPATIENT
Start: 2023-12-26

## 2024-01-04 RX ORDER — OLANZAPINE 7.5 MG/1
7.5 TABLET, FILM COATED ORAL NIGHTLY
Qty: 30 TABLET | Refills: 0 | Status: SHIPPED | OUTPATIENT
Start: 2024-01-04

## 2024-01-08 NOTE — TELEPHONE ENCOUNTER
Pt states [facility] has not received a Rx for his Omeprazole 20 mg as of yet.    They gave him an emergency supply but he still needs an Rx sent please.    Thank you.

## 2024-01-09 RX ORDER — OMEPRAZOLE 20 MG/1
20 CAPSULE, DELAYED RELEASE ORAL DAILY
Qty: 30 CAPSULE | Refills: 2 | Status: SHIPPED | OUTPATIENT
Start: 2024-01-09

## 2024-01-10 PROBLEM — Z01.818 PREOPERATIVE EXAMINATION: Status: RESOLVED | Noted: 2023-12-11 | Resolved: 2024-01-10

## 2024-01-12 ENCOUNTER — TELEPHONE (OUTPATIENT)
Dept: PRIMARY CARE CLINIC | Age: 64
End: 2024-01-12

## 2024-01-12 NOTE — TELEPHONE ENCOUNTER
Pt called stating that he was experiencing bilateral ankle and foot swelling, cough, bilateral arm weakness, and fatigue. His home nurse noticed the swelling on Tues 1/9/24. Pt denies abnormal SOB (hx of COPD), chest pain, pain/tingling in arms. Pt was advised to come to the walk-in or go to the ED per NB and MA for his sx. He states that he is very tired and wants to sleep - he does not have the energy to drive to the ED or to [place] today, but may have his wife take him to the ED tomorrow.     While on the phone MA could hear wheezing and the patient was getting SOB while talking.     He would like to know if NB would be willing to prescribe a water pill for him to try for a week to see if that helps the swelling.     Please advise.

## 2024-01-19 NOTE — TELEPHONE ENCOUNTER
Requesting 90 day supply and taking twice daily       Jai Walters is requesting a refill on the following medication(s):  Requested Prescriptions     Pending Prescriptions Disp Refills    omeprazole (PRILOSEC) 40 MG delayed release capsule [Pharmacy Med Name: OMEPRAZOLE DR 40 MG CAPSULE] 180 capsule 0     Sig: take 1 capsule by mouth twice a day       Last Visit Date (If Applicable):  12/11/2023    Next Visit Date:    1/23/2024

## 2024-01-23 ENCOUNTER — HOSPITAL ENCOUNTER (OUTPATIENT)
Age: 64
Setting detail: SPECIMEN
Discharge: HOME | End: 2024-01-23

## 2024-01-23 ENCOUNTER — OFFICE VISIT (OUTPATIENT)
Dept: PRIMARY CARE CLINIC | Age: 64
End: 2024-01-23

## 2024-01-23 VITALS
DIASTOLIC BLOOD PRESSURE: 62 MMHG | HEIGHT: 67 IN | BODY MASS INDEX: 31.55 KG/M2 | HEART RATE: 102 BPM | WEIGHT: 201 LBS | OXYGEN SATURATION: 96 % | SYSTOLIC BLOOD PRESSURE: 114 MMHG

## 2024-01-23 DIAGNOSIS — B35.1 ONYCHOMYCOSIS: ICD-10-CM

## 2024-01-23 DIAGNOSIS — J44.1 CHRONIC OBSTRUCTIVE PULMONARY DISEASE WITH ACUTE EXACERBATION (HCC): Primary | ICD-10-CM

## 2024-01-23 DIAGNOSIS — R60.0 LOWER EXTREMITY EDEMA: ICD-10-CM

## 2024-01-23 DIAGNOSIS — R06.09 DYSPNEA ON EXERTION: ICD-10-CM

## 2024-01-23 DIAGNOSIS — E11.649 TYPE 2 DIABETES MELLITUS WITH HYPOGLYCEMIA WITHOUT COMA, WITHOUT LONG-TERM CURRENT USE OF INSULIN (HCC): ICD-10-CM

## 2024-01-23 DIAGNOSIS — J44.1 CHRONIC OBSTRUCTIVE PULMONARY DISEASE WITH ACUTE EXACERBATION (HCC): ICD-10-CM

## 2024-01-23 PROBLEM — J44.9 CHRONIC OBSTRUCTIVE PULMONARY DISEASE (HCC): Status: ACTIVE | Noted: 2020-11-05

## 2024-01-23 LAB
ALBUMIN SERPL-MCNC: 4.9 G/DL (ref 3.5–5.2)
ALBUMIN/GLOB SERPL: 2 {RATIO} (ref 1–2.5)
ALP SERPL-CCNC: 66 U/L (ref 40–129)
ALT SERPL-CCNC: 20 U/L (ref 10–50)
ANION GAP SERPL CALCULATED.3IONS-SCNC: 13 MMOL/L (ref 9–16)
AST SERPL-CCNC: 22 U/L (ref 10–50)
BASOPHILS # BLD: 0.07 K/UL (ref 0–0.2)
BASOPHILS NFR BLD: 1 % (ref 0–2)
BILIRUB SERPL-MCNC: 0.3 MG/DL (ref 0–1.2)
BNP SERPL-MCNC: <36 PG/ML (ref 0–300)
BUN SERPL-MCNC: 9 MG/DL (ref 8–23)
CALCIUM SERPL-MCNC: 10.1 MG/DL (ref 8.6–10.4)
CHLORIDE SERPL-SCNC: 96 MMOL/L (ref 98–107)
CO2 SERPL-SCNC: 22 MMOL/L (ref 20–31)
CREAT SERPL-MCNC: 0.7 MG/DL (ref 0.7–1.2)
EOSINOPHIL # BLD: 0.05 K/UL (ref 0–0.44)
EOSINOPHILS RELATIVE PERCENT: 1 % (ref 1–4)
ERYTHROCYTE [DISTWIDTH] IN BLOOD BY AUTOMATED COUNT: 11.9 % (ref 11.8–14.4)
GFR SERPL CREATININE-BSD FRML MDRD: >60 ML/MIN/1.73M2
GLUCOSE SERPL-MCNC: 109 MG/DL (ref 74–99)
HCT VFR BLD AUTO: 40.6 % (ref 40.7–50.3)
HGB BLD-MCNC: 14.1 G/DL (ref 13–17)
IMM GRANULOCYTES # BLD AUTO: <0.03 K/UL (ref 0–0.3)
IMM GRANULOCYTES NFR BLD: 0 %
INR PPP: 1
LYMPHOCYTES NFR BLD: 1.56 K/UL (ref 1.1–3.7)
LYMPHOCYTES RELATIVE PERCENT: 22 % (ref 24–43)
MCH RBC QN AUTO: 32.3 PG (ref 25.2–33.5)
MCHC RBC AUTO-ENTMCNC: 34.7 G/DL (ref 28.4–34.8)
MCV RBC AUTO: 92.9 FL (ref 82.6–102.9)
MONOCYTES NFR BLD: 0.54 K/UL (ref 0.1–1.2)
MONOCYTES NFR BLD: 8 % (ref 3–12)
NEUTROPHILS NFR BLD: 68 % (ref 36–65)
NEUTS SEG NFR BLD: 4.84 K/UL (ref 1.5–8.1)
NRBC BLD-RTO: 0 PER 100 WBC
PLATELET # BLD AUTO: 311 K/UL (ref 138–453)
PMV BLD AUTO: 8.8 FL (ref 8.1–13.5)
POTASSIUM SERPL-SCNC: 4.5 MMOL/L (ref 3.7–5.3)
PROT SERPL-MCNC: 7.7 G/DL (ref 6.6–8.7)
PROTHROMBIN TIME: 12.9 SEC (ref 11.7–14.9)
RBC # BLD AUTO: 4.37 M/UL (ref 4.21–5.77)
SODIUM SERPL-SCNC: 131 MMOL/L (ref 136–145)
WBC OTHER # BLD: 7.1 K/UL (ref 3.5–11.3)

## 2024-01-23 RX ORDER — PREDNISONE 20 MG/1
20 TABLET ORAL DAILY
Qty: 5 TABLET | Refills: 0 | Status: SHIPPED | OUTPATIENT
Start: 2024-01-23 | End: 2024-01-23 | Stop reason: CLARIF

## 2024-01-23 RX ORDER — OMEPRAZOLE 40 MG/1
40 CAPSULE, DELAYED RELEASE ORAL 2 TIMES DAILY
Qty: 180 CAPSULE | Refills: 0 | Status: SHIPPED | OUTPATIENT
Start: 2024-01-23

## 2024-01-23 RX ORDER — FLUTICASONE PROPIONATE 50 MCG
2 SPRAY, SUSPENSION (ML) NASAL DAILY
COMMUNITY
Start: 2024-01-22

## 2024-01-23 RX ORDER — AZITHROMYCIN 250 MG/1
250 TABLET, FILM COATED ORAL SEE ADMIN INSTRUCTIONS
Qty: 6 TABLET | Refills: 0 | Status: SHIPPED | OUTPATIENT
Start: 2024-01-23 | End: 2024-01-28

## 2024-01-23 RX ORDER — PREDNISONE 20 MG/1
40 TABLET ORAL
Qty: 10 TABLET | Refills: 0 | Status: SHIPPED | OUTPATIENT
Start: 2024-01-23 | End: 2024-01-28

## 2024-01-23 RX ORDER — ASPIRIN 81 MG/1
81 TABLET ORAL DAILY
COMMUNITY

## 2024-01-23 ASSESSMENT — PATIENT HEALTH QUESTIONNAIRE - PHQ9
3. TROUBLE FALLING OR STAYING ASLEEP: 3
9. THOUGHTS THAT YOU WOULD BE BETTER OFF DEAD, OR OF HURTING YOURSELF: 0
10. IF YOU CHECKED OFF ANY PROBLEMS, HOW DIFFICULT HAVE THESE PROBLEMS MADE IT FOR YOU TO DO YOUR WORK, TAKE CARE OF THINGS AT HOME, OR GET ALONG WITH OTHER PEOPLE: 1
1. LITTLE INTEREST OR PLEASURE IN DOING THINGS: 1
4. FEELING TIRED OR HAVING LITTLE ENERGY: 2
SUM OF ALL RESPONSES TO PHQ QUESTIONS 1-9: 10
5. POOR APPETITE OR OVEREATING: 0
2. FEELING DOWN, DEPRESSED OR HOPELESS: 1
SUM OF ALL RESPONSES TO PHQ QUESTIONS 1-9: 10
SUM OF ALL RESPONSES TO PHQ QUESTIONS 1-9: 10
8. MOVING OR SPEAKING SO SLOWLY THAT OTHER PEOPLE COULD HAVE NOTICED. OR THE OPPOSITE, BEING SO FIGETY OR RESTLESS THAT YOU HAVE BEEN MOVING AROUND A LOT MORE THAN USUAL: 1
6. FEELING BAD ABOUT YOURSELF - OR THAT YOU ARE A FAILURE OR HAVE LET YOURSELF OR YOUR FAMILY DOWN: 1
SUM OF ALL RESPONSES TO PHQ9 QUESTIONS 1 & 2: 2
SUM OF ALL RESPONSES TO PHQ QUESTIONS 1-9: 10

## 2024-01-23 ASSESSMENT — ENCOUNTER SYMPTOMS
WHEEZING: 1
DIARRHEA: 0
SHORTNESS OF BREATH: 1
COUGH: 1
NAUSEA: 0
ABDOMINAL PAIN: 0
CONSTIPATION: 0
VOMITING: 0

## 2024-01-23 ASSESSMENT — COLUMBIA-SUICIDE SEVERITY RATING SCALE - C-SSRS
1. WITHIN THE PAST MONTH, HAVE YOU WISHED YOU WERE DEAD OR WISHED YOU COULD GO TO SLEEP AND NOT WAKE UP?: NO
6. HAVE YOU EVER DONE ANYTHING, STARTED TO DO ANYTHING, OR PREPARED TO DO ANYTHING TO END YOUR LIFE?: NO
2. HAVE YOU ACTUALLY HAD ANY THOUGHTS OF KILLING YOURSELF?: NO

## 2024-01-23 NOTE — PROGRESS NOTES
[facility]      Date of Visit:  2024  Patient Name: Jai Walters   Patient :  1960     CHIEF COMPLAINT:     Jai Walters is a 63 y.o. male who presents today to be evaluated for the following condition(s):  Chief Complaint   Patient presents with    COPD     Pt was seen by pulm in Sept - he decided to quit going.     Pt states that he has been coughing since LOV and it has been worsening. He states that his chest feels tight and there is a lot of congestion - producing clear phlegm when he is able to break up the congestion.     Edema     Pt had bilateral ankle swelling approx 2 weeks ago that has since resolved. Pt was c/o cough, bilateral arm weakness, and fatigue that started around the same time and these have been ongoing since 24. Pt states that he has been taking 1-2 naps a day - his \"eyes get blurry\" and it makes him tired, he believes that it is from the cataract in his  R eye.     PHQ9 done today.        HISTORY OF PRESENT ILLNESS:      HPI   Patient is presenting to with persisting cough, lower extremity edema.    Patient has noted a worsening cough over the past several weeks; cough productive of clear sputum; he notes chest congestion, tightness with increased dyspnea on exertion.  He states that approximately 2 weeks ago, he experience bilateral pedal edema that has since resolved.  He denies any chest pain, orthopnea, paroxysmal nocturnal dyspnea.  He was also extremely fatigued, weak during this time but these symptoms have improved.    Patient with normal echocardiogram, low risk stress test within the past year.    Patient previously following with Dr. Jayson Anderson, pulmonology; states he no longer would like to follow with pulmonology at this time.    Previous cataract surgery rescheduled for April; evidently, patient did not hold his plavix; his ophthalmologist is out of the country until later in the spring.    T2DM; FreeStyle [place] 2 data reviewed today; excellently

## 2024-01-24 NOTE — ASSESSMENT & PLAN NOTE
Currently exacerbated.  Plan as below.  Patient to call in 2-3 days with an update on how he is doing.

## 2024-01-25 ENCOUNTER — TELEPHONE (OUTPATIENT)
Dept: PRIMARY CARE CLINIC | Age: 64
End: 2024-01-25

## 2024-01-25 NOTE — TELEPHONE ENCOUNTER
Pt called regarding memory concerns. He states that his memory is \"getting crazy\". He finds that he is forgetting to pay bills and sign and address money orders. He states that he first noticed this happening about 2 months ago. His mother had dementia and he is afraid that this could be the start for him.     He went to [employer] and bought a memory supplement, hoping that it could help.     Pt is scheduled 1/30/24 with NB to discuss this.

## 2024-02-02 ENCOUNTER — TELEPHONE (OUTPATIENT)
Dept: PRIMARY CARE CLINIC | Age: 64
End: 2024-02-02

## 2024-02-02 DIAGNOSIS — E11.649 TYPE 2 DIABETES MELLITUS WITH HYPOGLYCEMIA WITHOUT COMA, WITHOUT LONG-TERM CURRENT USE OF INSULIN (HCC): Primary | ICD-10-CM

## 2024-02-02 RX ORDER — PEN NEEDLE, DIABETIC 31 GX5/16"
2 NEEDLE, DISPOSABLE MISCELLANEOUS 2 TIMES DAILY
Qty: 100 EACH | Refills: 1 | Status: SHIPPED | OUTPATIENT
Start: 2024-02-02

## 2024-02-02 RX ORDER — BLOOD-GLUCOSE METER
1 KIT MISCELLANEOUS DAILY
Qty: 1 KIT | Refills: 0 | Status: SHIPPED | OUTPATIENT
Start: 2024-02-02

## 2024-02-02 RX ORDER — GLUCOSAMINE HCL/CHONDROITIN SU 500-400 MG
CAPSULE ORAL
Qty: 100 STRIP | Refills: 1 | Status: SHIPPED | OUTPATIENT
Start: 2024-02-02

## 2024-02-02 RX ORDER — LANCETS 30 GAUGE
1 EACH MISCELLANEOUS 2 TIMES DAILY
Qty: 100 EACH | Refills: 0 | Status: SHIPPED | OUTPATIENT
Start: 2024-02-02

## 2024-02-02 NOTE — TELEPHONE ENCOUNTER
I spoke with pt regarding the message from Dr. Power. I explained that he needed to hold his Metformin until further notice. Pt is scheduled for 2/5/24 @ 9:45am. Pt was not near his monitor for a BG reading.     Pt has concerns about the medication changes and it affecting his A1c as he has surgery scheduled for April 2024.     Glucometer and supplies pended for provider review.

## 2024-02-02 NOTE — TELEPHONE ENCOUNTER
Per our previous conversation, please have him hold metformin until further notice.  I would prefer that his blood glucose run high as opposed to low at this time.  Please send in Rx for testing supplies as a way to verify that his CGM is accurate (there will always be some variation as these measure BG differently, CGM measures interstitial fluid and will lag glucometer which measure blood concentration).  Please have him check his blood glucose and compare to CGM, should be close. I would like to see him on Monday if possible.  What is his blood glucose now?

## 2024-02-02 NOTE — TELEPHONE ENCOUNTER
Pt called stating that his Freestyle monitor keeps beeping and giving him readings as low 48. He states that he has eaten today (2 breakfast burritos and a glass of milk) and still getting these low readings. Yesterday, he had a bowl of cereal and his BG was near 300. Pt is taking Metformin 500mg daily and Trulicity 0.5ml weekly as prescribed. Denies shaking, fatigue, dizziness. He does not have a manual glucometer to check his BG to verify these readings.     Readings:  80   70s  48    Please advise.

## 2024-02-23 ENCOUNTER — HOSPITAL ENCOUNTER (OUTPATIENT)
Age: 64
Setting detail: SPECIMEN
Discharge: HOME | End: 2024-02-23

## 2024-02-23 ENCOUNTER — OFFICE VISIT (OUTPATIENT)
Dept: PRIMARY CARE CLINIC | Age: 64
End: 2024-02-23
Payer: MEDICARE

## 2024-02-23 VITALS
DIASTOLIC BLOOD PRESSURE: 64 MMHG | HEIGHT: 67 IN | SYSTOLIC BLOOD PRESSURE: 112 MMHG | HEART RATE: 107 BPM | BODY MASS INDEX: 31.55 KG/M2 | WEIGHT: 201 LBS | OXYGEN SATURATION: 97 %

## 2024-02-23 DIAGNOSIS — Z11.4 ENCOUNTER FOR SCREENING FOR HUMAN IMMUNODEFICIENCY VIRUS (HIV): ICD-10-CM

## 2024-02-23 DIAGNOSIS — R41.3 MEMORY CHANGE: ICD-10-CM

## 2024-02-23 DIAGNOSIS — J45.40 MODERATE PERSISTENT ASTHMA WITHOUT COMPLICATION: ICD-10-CM

## 2024-02-23 DIAGNOSIS — E11.9 TYPE 2 DIABETES MELLITUS WITHOUT RETINOPATHY (HCC): Primary | ICD-10-CM

## 2024-02-23 DIAGNOSIS — G31.84 MILD COGNITIVE IMPAIRMENT OF UNCERTAIN OR UNKNOWN ETIOLOGY: ICD-10-CM

## 2024-02-23 DIAGNOSIS — J43.9 PULMONARY EMPHYSEMA, UNSPECIFIED EMPHYSEMA TYPE (HCC): ICD-10-CM

## 2024-02-23 DIAGNOSIS — G25.0 ESSENTIAL TREMOR: ICD-10-CM

## 2024-02-23 LAB
FOLATE SERPL-MCNC: 13.4 NG/ML (ref 4.8–24.2)
HBA1C MFR BLD: 6.1 %
HIV 1+2 AB+HIV1 P24 AG SERPL QL IA: NONREACTIVE
T PALLIDUM AB SER QL IA: NONREACTIVE
TSH SERPL DL<=0.05 MIU/L-ACNC: 1.09 UIU/ML (ref 0.27–4.2)
VIT B12 SERPL-MCNC: 546 PG/ML (ref 232–1245)

## 2024-02-23 PROCEDURE — 3044F HG A1C LEVEL LT 7.0%: CPT | Performed by: STUDENT IN AN ORGANIZED HEALTH CARE EDUCATION/TRAINING PROGRAM

## 2024-02-23 PROCEDURE — 83036 HEMOGLOBIN GLYCOSYLATED A1C: CPT | Performed by: STUDENT IN AN ORGANIZED HEALTH CARE EDUCATION/TRAINING PROGRAM

## 2024-02-23 PROCEDURE — 3078F DIAST BP <80 MM HG: CPT | Performed by: STUDENT IN AN ORGANIZED HEALTH CARE EDUCATION/TRAINING PROGRAM

## 2024-02-23 PROCEDURE — 3074F SYST BP LT 130 MM HG: CPT | Performed by: STUDENT IN AN ORGANIZED HEALTH CARE EDUCATION/TRAINING PROGRAM

## 2024-02-23 PROCEDURE — 99214 OFFICE O/P EST MOD 30 MIN: CPT | Performed by: STUDENT IN AN ORGANIZED HEALTH CARE EDUCATION/TRAINING PROGRAM

## 2024-02-23 RX ORDER — GABAPENTIN 100 MG/1
200 CAPSULE ORAL NIGHTLY
Qty: 60 CAPSULE | Refills: 0 | Status: SHIPPED | OUTPATIENT
Start: 2024-02-23 | End: 2024-03-24

## 2024-02-23 NOTE — PROGRESS NOTES
Chronic respiratory failure with hypoxia (HCC) 2023    COPD (chronic obstructive pulmonary disease) (HCC)     Diabetes mellitus (HCC)     Enlarged prostate     GERD (gastroesophageal reflux disease)     Hyperlipidemia     Hypertension        Past Surgical History:   Procedure Laterality Date    HERNIA REPAIR          Social History     Socioeconomic History    Marital status:    Tobacco Use    Smoking status: Former     Current packs/day: 0.00     Average packs/day: 4.0 packs/day for 54.0 years (216.0 ttl pk-yrs)     Types: Cigarettes     Start date:      Quit date:      Years since quittin.1    Smokeless tobacco: Never   Vaping Use    Vaping Use: Never used   Substance and Sexual Activity    Alcohol use: Not Currently     Comment: sober for 1 year    Drug use: Never     Social Determinants of Health     Financial Resource Strain: Low Risk  (2023)    Overall Financial Resource Strain (CARDIA)     Difficulty of Paying Living Expenses: Not hard at all   Transportation Needs: Unknown (2023)    PRAPARE - Transportation     Lack of Transportation (Non-Medical): No   Housing Stability: Unknown (2023)    Housing Stability Vital Sign     Unstable Housing in the Last Year: No        Family History   Problem Relation Age of Onset    Cataracts Mother     Dementia Mother     Hypertension Mother     Heart Attack Father     Alcohol Abuse Father     Hypertension Father     Diabetes Sister     Glaucoma Neg Hx         PHYSICAL EXAM:     /64 (Site: Right Lower Arm, Position: Sitting)   Pulse (!) 107   Ht 1.702 m (5' 7\")   Wt 91.2 kg (201 lb)   SpO2 97%   BMI 31.48 kg/m²    Physical Exam    ASSESSMENT/PLAN     1. Type 2 diabetes mellitus without retinopathy (HCC)  -     POCT glycosylated hemoglobin (Hb A1C)  2. Memory change  -     Vitamin B12 & Folate; Future  -     TSH with Reflex; Future  -     HIV Screen; Future  -     T. Pallidum Ab; Future  3. Pulmonary emphysema, unspecified

## 2024-02-26 ENCOUNTER — TELEPHONE (OUTPATIENT)
Dept: PRIMARY CARE CLINIC | Age: 64
End: 2024-02-26

## 2024-03-18 ENCOUNTER — TELEPHONE (OUTPATIENT)
Dept: PRIMARY CARE CLINIC | Age: 64
End: 2024-03-18

## 2024-03-18 NOTE — TELEPHONE ENCOUNTER
Pt called stating he lost the number to the company he gets his Freestyle Sensors from.    He is requesting the office to call the company to be able to have them sent.    He would like a call back please. Thank you.

## 2024-03-20 ENCOUNTER — TELEPHONE (OUTPATIENT)
Dept: PRIMARY CARE CLINIC | Age: 64
End: 2024-03-20

## 2024-03-20 NOTE — TELEPHONE ENCOUNTER
Pt states that he found out how to order his Freestyle sensors and to disregard his request for Dr. Power/ Malena to do so.

## 2024-03-26 ENCOUNTER — TELEPHONE (OUTPATIENT)
Dept: PRIMARY CARE CLINIC | Age: 64
End: 2024-03-26

## 2024-03-26 NOTE — TELEPHONE ENCOUNTER
Pt called stating he is requesting to discontinue the Trulicity due to his sugar keeps bottoming out. He is trying everything he can think of but it hasn't helped and he is not feeling well at all.    Please advise Thank you.

## 2024-03-26 NOTE — TELEPHONE ENCOUNTER
MA called pt for clarification and pt stated that his meter \"keeps beeping and beeping and beeping\" indicating that his BG is low (60s-80s). Yesterday, his BG was 64. He tried eating peanut butter crackers and drinking milk and it would \"bump\" his numbers temporarily. Pt states that he has not been hungry lately; He is trying to eat snacks throughout the day, in addition to his meals, to keep his BG from dropping too low. He stated that he is feeling well overall, but has been nauseated and constipated - unsure if this is related.     Pt would like to stop taking the Trulicity - he is due for an injection on Friday, 3/29/24. He stated that he would like to skip the injection for Friday and \"hopefully the numbers will be better\" for his appt on Wed, 4/3/24.     Please advise.

## 2024-04-03 ENCOUNTER — OFFICE VISIT (OUTPATIENT)
Dept: PRIMARY CARE CLINIC | Age: 64
End: 2024-04-03
Payer: MEDICARE

## 2024-04-03 VITALS
HEIGHT: 67 IN | DIASTOLIC BLOOD PRESSURE: 56 MMHG | HEART RATE: 88 BPM | SYSTOLIC BLOOD PRESSURE: 124 MMHG | WEIGHT: 196 LBS | OXYGEN SATURATION: 98 % | BODY MASS INDEX: 30.76 KG/M2

## 2024-04-03 DIAGNOSIS — E78.2 MIXED HYPERLIPIDEMIA: ICD-10-CM

## 2024-04-03 DIAGNOSIS — L21.9 SEBORRHEIC DERMATITIS: ICD-10-CM

## 2024-04-03 DIAGNOSIS — R41.3 MEMORY CHANGE: ICD-10-CM

## 2024-04-03 DIAGNOSIS — G47.33 OBSTRUCTIVE SLEEP APNEA: ICD-10-CM

## 2024-04-03 DIAGNOSIS — J43.9 PULMONARY EMPHYSEMA, UNSPECIFIED EMPHYSEMA TYPE (HCC): ICD-10-CM

## 2024-04-03 DIAGNOSIS — I10 PRIMARY HYPERTENSION: ICD-10-CM

## 2024-04-03 DIAGNOSIS — G25.0 ESSENTIAL TREMOR: ICD-10-CM

## 2024-04-03 DIAGNOSIS — E11.9 TYPE 2 DIABETES MELLITUS WITHOUT RETINOPATHY (HCC): ICD-10-CM

## 2024-04-03 DIAGNOSIS — I73.9 PERIPHERAL ARTERIAL DISEASE (HCC): ICD-10-CM

## 2024-04-03 DIAGNOSIS — J45.40 MODERATE PERSISTENT ASTHMA WITHOUT COMPLICATION: ICD-10-CM

## 2024-04-03 DIAGNOSIS — Z01.818 PREOP EXAMINATION: Primary | ICD-10-CM

## 2024-04-03 PROCEDURE — 99215 OFFICE O/P EST HI 40 MIN: CPT | Performed by: STUDENT IN AN ORGANIZED HEALTH CARE EDUCATION/TRAINING PROGRAM

## 2024-04-03 PROCEDURE — 3078F DIAST BP <80 MM HG: CPT | Performed by: STUDENT IN AN ORGANIZED HEALTH CARE EDUCATION/TRAINING PROGRAM

## 2024-04-03 PROCEDURE — 3044F HG A1C LEVEL LT 7.0%: CPT | Performed by: STUDENT IN AN ORGANIZED HEALTH CARE EDUCATION/TRAINING PROGRAM

## 2024-04-03 PROCEDURE — 3074F SYST BP LT 130 MM HG: CPT | Performed by: STUDENT IN AN ORGANIZED HEALTH CARE EDUCATION/TRAINING PROGRAM

## 2024-04-03 PROCEDURE — 93000 ELECTROCARDIOGRAM COMPLETE: CPT | Performed by: STUDENT IN AN ORGANIZED HEALTH CARE EDUCATION/TRAINING PROGRAM

## 2024-04-03 RX ORDER — KETOCONAZOLE 20 MG/ML
SHAMPOO TOPICAL
Qty: 120 ML | Refills: 1 | Status: SHIPPED | OUTPATIENT
Start: 2024-04-03

## 2024-04-03 NOTE — PROGRESS NOTES
[facility]      Date of Visit:  4/3/2024  Patient Name: Jai Walters   Patient :  1960     CHIEF COMPLAINT:     Jai Walters is a 64 y.o. male who presents today to be evaluated for the following condition(s):  Chief Complaint   Patient presents with    Pre-op Exam     Pt is having R cataract surgery on 24 - Dr. Briscoe in [facility].     Pt will need an EKG today.    Other     Pt would like to review the labs that he had done 24.        HISTORY OF PRESENT ILLNESS:      HPI   Patient is presenting today for preoperative evaluation, memory concerns.    Patient is currently scheduled to undergo right cataract surgery with Dr. Briscoe on 24.  Patient is very much looking forward to having this done; several issues have come up over the past year which have required rescheduling.    Currently, patient reports that he is feeling well.  Patient denies any chest pain, palpitations, lightheadedness, dizziness, presyncope or syncope.  He experiences baseline dyspnea on exertion which is stable; secondary to COPD/asthma which is currently well controlled.  Denies any change in chronic cough which is minimal; no change in sputum production or color.  He is compliant with Trelegy; uses albuterol infrequently on average of twice weekly.  He uses home oxygen.  Patient has had recent cardiac workup with low risk nuclear stress test in 2023.    T2DM; last year patient's previously well controlled diabetes worsened significantly due to poor adherence to his diabetic diet with HbgA1c peaking at 13.7; subsequently, patient was started on metformin, Trulicity and did an excellent job of modifying his diet with a remarkable response; over the past several months, his blood glucose has been excellently controlled with HbgA1c improved to 6.1 in February, 5.6 in 2023.  More recently, patient has been experiencing frequent hypoglycemic episodes that necessitated stopping his metformin at his last

## 2024-04-10 ENCOUNTER — TELEPHONE (OUTPATIENT)
Dept: PRIMARY CARE CLINIC | Age: 64
End: 2024-04-10

## 2024-04-10 DIAGNOSIS — R80.9 TYPE 2 DIABETES MELLITUS WITH DIABETIC MICROALBUMINURIA, WITHOUT LONG-TERM CURRENT USE OF INSULIN (HCC): Primary | ICD-10-CM

## 2024-04-10 DIAGNOSIS — E11.29 TYPE 2 DIABETES MELLITUS WITH DIABETIC MICROALBUMINURIA, WITHOUT LONG-TERM CURRENT USE OF INSULIN (HCC): Primary | ICD-10-CM

## 2024-04-10 NOTE — TELEPHONE ENCOUNTER
Pt called stating that his BG has been in the 200s, the lowest he has been getting is in the 160s. Pt's fasting BG this morning was 201. NB had pt stop Trulicity on 3/26/24 due to low BG levels, pt was taken off Metformin prior to that. Pt is currently not taking any medications for DM. He denies any sx and states that he is feeling well, just concerned about his readings.     Please advise.

## 2024-04-15 ENCOUNTER — TELEPHONE (OUTPATIENT)
Dept: PRIMARY CARE CLINIC | Age: 64
End: 2024-04-15

## 2024-04-15 NOTE — TELEPHONE ENCOUNTER
Spoke with patient; continuing to experience hyperglycemia; appetite has picked up some since discontinuing Trulicity (due to hypoglycemia).  Will start Jardiance and titrate as necessary in light of h/o microalbuminuria and for CVD risk factor reduction.  Patient to have BMP performed in 1 week to recheck electrolytes, renal function.  Will call in 1 week with BG numbers; sooner with any concerns.

## 2024-04-18 ASSESSMENT — ENCOUNTER SYMPTOMS
WHEEZING: 0
SHORTNESS OF BREATH: 0
NAUSEA: 0
BLOOD IN STOOL: 0
ABDOMINAL PAIN: 0
CONSTIPATION: 0
VOMITING: 0
DIARRHEA: 0
COUGH: 0

## 2024-04-18 NOTE — ASSESSMENT & PLAN NOTE
Untreated; intolerant to CPAP in past.  Discussed repeating sleep study, referring to sleep specialist to see if there have been advancements in masks since last tried; also discussed Inspire system.  Patient would prefer to revisit this at follow up.

## 2024-04-18 NOTE — ASSESSMENT & PLAN NOTE
Some concern for early cognitive changes; discussed ways to maximize brain health extensively today.  Areas of opportunity may be untreated DISHA, improvement in depression.  Discussed updated brain imaging but will defer for now; will reconsider at follow up.

## 2024-04-18 NOTE — ASSESSMENT & PLAN NOTE
Stable; continue cilostazol, ASA, plavix, simvastatin.  Follow up with Dr. Rinaldi as scheduled.

## 2024-04-18 NOTE — ASSESSMENT & PLAN NOTE
Patient may proceed with upcoming cataract surgery; no additional cardiovascular testing required at this time.  Attention should be paid to tamsulosin use and potential risk for floppy iris syndrome.

## 2024-04-18 NOTE — ASSESSMENT & PLAN NOTE
Patient to continue to monitor blood glucose; will call with any changes.  Continue off medication for now.

## 2024-04-24 ENCOUNTER — TELEPHONE (OUTPATIENT)
Dept: PRIMARY CARE CLINIC | Age: 64
End: 2024-04-24

## 2024-04-24 RX ORDER — BENZONATATE 100 MG/1
100 CAPSULE ORAL 3 TIMES DAILY PRN
Qty: 30 CAPSULE | Refills: 0 | Status: SHIPPED | OUTPATIENT
Start: 2024-04-24 | End: 2024-05-04

## 2024-04-24 NOTE — TELEPHONE ENCOUNTER
If patient is coughing more, should be seen; does he want to come in for an appointment?  For cough, he can take Mucinex DM and I can send Rx for tessalon perles if desired.  For pain, he can take acetaminophen 500-1000 mg 1-2 x daily as needed.  Regarding Jardiance, continue same dose for now; remind him to have BMP drawn to check electrolytes, kidney function.

## 2024-04-24 NOTE — TELEPHONE ENCOUNTER
Pt would like the Rx sent for the Tessalon Perles and he will try the OTC Mucinex DM for his cough.     He is taking Tylenol for the pain and states that it will help the rib pain for a couple of hours. Pt declined an earlier appt.     He will stay on the current dose of his Jardiance and he was asking to get his labs done when he comes to the office on 5/15/24, but I offered to fax the order to [facility] as it is closer to the pt's home and will likely be done in time to review results with provider at [facility].

## 2024-04-24 NOTE — TELEPHONE ENCOUNTER
Last appt 04/03/24  Next appt 05/15/24      Pt called requesting a call back due to he states his lungs and ribs hurt from coughing and he would like something called in or if there is anything OTC for him to get.    He is scared his COPD is worsening.    Pt also stated his sugars are holding at 170 with the Jardience.    Please advise. Thank you.

## 2024-04-25 DIAGNOSIS — R39.12 BENIGN PROSTATIC HYPERPLASIA WITH WEAK URINARY STREAM: ICD-10-CM

## 2024-04-25 DIAGNOSIS — N40.1 BENIGN PROSTATIC HYPERPLASIA WITH WEAK URINARY STREAM: ICD-10-CM

## 2024-04-25 RX ORDER — TAMSULOSIN HYDROCHLORIDE 0.4 MG/1
0.4 CAPSULE ORAL DAILY
Qty: 90 CAPSULE | Refills: 1 | Status: SHIPPED | OUTPATIENT
Start: 2024-04-25

## 2024-04-25 NOTE — TELEPHONE ENCOUNTER
Jai Walters is requesting a refill on the following medication(s):  Requested Prescriptions     Pending Prescriptions Disp Refills    tamsulosin (FLOMAX) 0.4 MG capsule [Pharmacy Med Name: TAMSULOSIN HCL 0.4 MG CAPSULE] 90 capsule 1     Sig: take 1 capsule by mouth daily       Last Visit Date (If Applicable):  4/3/2024    Next Visit Date:    5/15/2024

## 2024-05-02 ENCOUNTER — TELEPHONE (OUTPATIENT)
Dept: PRIMARY CARE CLINIC | Age: 64
End: 2024-05-02

## 2024-05-02 NOTE — TELEPHONE ENCOUNTER
Patient wanted to inform you the surgeon moved his surgery back a week later it's now on 5/20/24 , now they are requesting he has another EKG and some other stuff done for clearance again since previous testing will be outside the 30 day window. Just a FYI EKG can be completed 5/15/24 OV.

## 2024-05-08 DIAGNOSIS — R80.9 TYPE 2 DIABETES MELLITUS WITH DIABETIC MICROALBUMINURIA, WITHOUT LONG-TERM CURRENT USE OF INSULIN (HCC): ICD-10-CM

## 2024-05-08 DIAGNOSIS — E11.29 TYPE 2 DIABETES MELLITUS WITH DIABETIC MICROALBUMINURIA, WITHOUT LONG-TERM CURRENT USE OF INSULIN (HCC): ICD-10-CM

## 2024-05-13 NOTE — TELEPHONE ENCOUNTER
Last Visit Date: 4/3/2024   Next Visit Date: 5/15/2024     Pt's pharmacy is requesting a refill of the pt's Diclofenac Sodium 1% gel - this is not on pt's current med list. SIG states \"apply 4 grams to affected area ON LEFT SHOULDER four times daily\".

## 2024-05-15 ENCOUNTER — OFFICE VISIT (OUTPATIENT)
Dept: PRIMARY CARE CLINIC | Age: 64
End: 2024-05-15
Payer: MEDICARE

## 2024-05-15 ENCOUNTER — HOSPITAL ENCOUNTER (OUTPATIENT)
Age: 64
Setting detail: SPECIMEN
Discharge: HOME | End: 2024-05-15

## 2024-05-15 VITALS
DIASTOLIC BLOOD PRESSURE: 66 MMHG | HEIGHT: 67 IN | WEIGHT: 195 LBS | SYSTOLIC BLOOD PRESSURE: 110 MMHG | BODY MASS INDEX: 30.61 KG/M2 | HEART RATE: 90 BPM | OXYGEN SATURATION: 97 %

## 2024-05-15 DIAGNOSIS — I25.10 ATHEROSCLEROSIS OF NATIVE CORONARY ARTERY OF NATIVE HEART WITHOUT ANGINA PECTORIS: ICD-10-CM

## 2024-05-15 DIAGNOSIS — K21.9 GASTROESOPHAGEAL REFLUX DISEASE WITHOUT ESOPHAGITIS: ICD-10-CM

## 2024-05-15 DIAGNOSIS — E11.649 TYPE 2 DIABETES MELLITUS WITH HYPOGLYCEMIA WITHOUT COMA, WITHOUT LONG-TERM CURRENT USE OF INSULIN (HCC): ICD-10-CM

## 2024-05-15 DIAGNOSIS — R11.0 NAUSEA: ICD-10-CM

## 2024-05-15 DIAGNOSIS — Z01.818 PREOPERATIVE EXAMINATION: Primary | ICD-10-CM

## 2024-05-15 DIAGNOSIS — J43.9 PULMONARY EMPHYSEMA, UNSPECIFIED EMPHYSEMA TYPE (HCC): ICD-10-CM

## 2024-05-15 LAB
ALBUMIN SERPL-MCNC: 4.8 G/DL (ref 3.5–5.2)
ALBUMIN/GLOB SERPL: 2 {RATIO} (ref 1–2.5)
ALP SERPL-CCNC: 73 U/L (ref 40–129)
ALT SERPL-CCNC: 16 U/L (ref 10–50)
ANION GAP SERPL CALCULATED.3IONS-SCNC: 15 MMOL/L (ref 9–16)
AST SERPL-CCNC: 22 U/L (ref 10–50)
BASOPHILS # BLD: 0.06 K/UL (ref 0–0.2)
BASOPHILS NFR BLD: 1 % (ref 0–2)
BILIRUB SERPL-MCNC: 0.6 MG/DL (ref 0–1.2)
BUN SERPL-MCNC: 7 MG/DL (ref 8–23)
CALCIUM SERPL-MCNC: 9.8 MG/DL (ref 8.6–10.4)
CHLORIDE SERPL-SCNC: 98 MMOL/L (ref 98–107)
CO2 SERPL-SCNC: 22 MMOL/L (ref 20–31)
CREAT SERPL-MCNC: 0.8 MG/DL (ref 0.7–1.2)
EOSINOPHIL # BLD: 0.13 K/UL (ref 0–0.44)
EOSINOPHILS RELATIVE PERCENT: 2 % (ref 1–4)
ERYTHROCYTE [DISTWIDTH] IN BLOOD BY AUTOMATED COUNT: 12.5 % (ref 11.8–14.4)
GFR, ESTIMATED: >90 ML/MIN/1.73M2
GLUCOSE SERPL-MCNC: 133 MG/DL (ref 74–99)
HCT VFR BLD AUTO: 44.2 % (ref 40.7–50.3)
HGB BLD-MCNC: 14.7 G/DL (ref 13–17)
IMM GRANULOCYTES # BLD AUTO: 0.03 K/UL (ref 0–0.3)
IMM GRANULOCYTES NFR BLD: 1 %
INR PPP: 1
LIPASE SERPL-CCNC: 21 U/L (ref 13–60)
LYMPHOCYTES NFR BLD: 1.16 K/UL (ref 1.1–3.7)
LYMPHOCYTES RELATIVE PERCENT: 20 % (ref 24–43)
MCH RBC QN AUTO: 30.4 PG (ref 25.2–33.5)
MCHC RBC AUTO-ENTMCNC: 33.3 G/DL (ref 28.4–34.8)
MCV RBC AUTO: 91.3 FL (ref 82.6–102.9)
MONOCYTES NFR BLD: 0.53 K/UL (ref 0.1–1.2)
MONOCYTES NFR BLD: 9 % (ref 3–12)
NEUTROPHILS NFR BLD: 67 % (ref 36–65)
NEUTS SEG NFR BLD: 3.88 K/UL (ref 1.5–8.1)
NRBC BLD-RTO: 0 PER 100 WBC
PLATELET # BLD AUTO: 276 K/UL (ref 138–453)
PMV BLD AUTO: 9.4 FL (ref 8.1–13.5)
POTASSIUM SERPL-SCNC: 4.3 MMOL/L (ref 3.7–5.3)
PROT SERPL-MCNC: 7.7 G/DL (ref 6.6–8.7)
PROTHROMBIN TIME: 13.3 SEC (ref 11.7–14.9)
RBC # BLD AUTO: 4.84 M/UL (ref 4.21–5.77)
SODIUM SERPL-SCNC: 135 MMOL/L (ref 136–145)
WBC OTHER # BLD: 5.8 K/UL (ref 3.5–11.3)

## 2024-05-15 PROCEDURE — 3078F DIAST BP <80 MM HG: CPT | Performed by: STUDENT IN AN ORGANIZED HEALTH CARE EDUCATION/TRAINING PROGRAM

## 2024-05-15 PROCEDURE — 3044F HG A1C LEVEL LT 7.0%: CPT | Performed by: STUDENT IN AN ORGANIZED HEALTH CARE EDUCATION/TRAINING PROGRAM

## 2024-05-15 PROCEDURE — 93000 ELECTROCARDIOGRAM COMPLETE: CPT | Performed by: STUDENT IN AN ORGANIZED HEALTH CARE EDUCATION/TRAINING PROGRAM

## 2024-05-15 PROCEDURE — 99214 OFFICE O/P EST MOD 30 MIN: CPT | Performed by: STUDENT IN AN ORGANIZED HEALTH CARE EDUCATION/TRAINING PROGRAM

## 2024-05-15 PROCEDURE — 3074F SYST BP LT 130 MM HG: CPT | Performed by: STUDENT IN AN ORGANIZED HEALTH CARE EDUCATION/TRAINING PROGRAM

## 2024-05-15 RX ORDER — LAMOTRIGINE 150 MG/1
150 TABLET ORAL DAILY
COMMUNITY
Start: 2024-05-12

## 2024-05-15 NOTE — PROGRESS NOTES
[facility]      Date of Visit:  5/15/2024  Patient Name: Jai Walters   Patient :  1960     CHIEF COMPLAINT:     Jai Walters is a 64 y.o. male who presents today to be evaluated for the following condition(s):  Chief Complaint   Patient presents with    Diabetes     Pt is taking Jardiance 10mg daily - pt does record his BG at home with a CGM. No concerns. Last A1c was 6.1 on 24.     Hypertension     Pt is taking Amlodipine and Lisinopril daily - pt does not check his BP at home. He has a wrist monitor, but does not use it. No concerns. Pt denies chest pain, edema, dizziness. Pt states that he gets dizzy after having a coughing spell.     Nausea     Pt states that he has been feeling nauseated for a couple weeks. He states that he has lost his appetite and the thought of food  upsets his stomach. He is eating about once per day on days that he is not feeling well. He states that his clothes feel a lot bigger and he thinks he is losing too much wt. Pt's chart shows a 1# wt loss.        HISTORY OF PRESENT ILLNESS:      HPI   Patient is presenting today for preoperative examination, T2DM, nausea.    Patient is currently scheduled to undergo cataract surgery with Dr. Briscoe on 24.  Regarding his upcoming surgery, patient denies any chest pain, palpitations, lower extremity edema, orthopnea; his respiratory status is stable and at his usual baseline; experienced chronic cough, dyspnea on exertion; denies shortness of breath at rest, wheezing, change in cough or sputum production/color.  Uses home oxygen.    Patient has noted decreased appetite, mild intermittent nausea which has been ongoing for the past 2 weeks.  He eats only once daily on average.  He denies any vomiting, diarrhea, constipation, melena or hematochezia.  No household members with similar symptoms.  No aggravating or relieving factors.  Patient with EGD and colonoscopy within the past year at [facility]; EGD with evidence of

## 2024-05-17 ENCOUNTER — TELEPHONE (OUTPATIENT)
Dept: PRIMARY CARE CLINIC | Age: 64
End: 2024-05-17

## 2024-05-17 DIAGNOSIS — J96.11 CHRONIC RESPIRATORY FAILURE WITH HYPOXIA (HCC): ICD-10-CM

## 2024-05-17 DIAGNOSIS — J43.9 PULMONARY EMPHYSEMA, UNSPECIFIED EMPHYSEMA TYPE (HCC): ICD-10-CM

## 2024-05-17 RX ORDER — FLUTICASONE FUROATE, UMECLIDINIUM BROMIDE AND VILANTEROL TRIFENATATE 200; 62.5; 25 UG/1; UG/1; UG/1
POWDER RESPIRATORY (INHALATION)
Qty: 60 EACH | Refills: 5 | Status: SHIPPED | OUTPATIENT
Start: 2024-05-17

## 2024-05-17 NOTE — TELEPHONE ENCOUNTER
Last OV:  5/15/2024    Next OV: 6/18/2024    Pharmacy:   [id number] - [place] - [address] -  [phone] - F [phone]  [address] [place] [id number]  Phone: [phone] Fax: [phone]       Confirmed? Yes

## 2024-05-17 NOTE — TELEPHONE ENCOUNTER
Pt states that he went to the hospital to have testing done and didn't know he needed an appt. Made appt. 5/21 at 8am. Reports nausea, and decreased appetite, denies vomiting. Wanted Dr. Power to know, and wondering if anything can be done to help in the meantime.

## 2024-05-18 PROBLEM — Z01.818 PREOP EXAMINATION: Status: RESOLVED | Noted: 2023-12-11 | Resolved: 2024-05-18

## 2024-05-22 RX ORDER — BENZONATATE 100 MG/1
CAPSULE ORAL
Qty: 30 CAPSULE | Refills: 0 | Status: SHIPPED | OUTPATIENT
Start: 2024-05-22

## 2024-05-22 NOTE — TELEPHONE ENCOUNTER
Refill appropriate?      Jai Walters is requesting a refill on the following medication(s):  Requested Prescriptions     Pending Prescriptions Disp Refills    benzonatate (TESSALON) 100 MG capsule [Pharmacy Med Name: BENZONATATE 100 MG CAPSULE] 30 capsule 0     Sig: take 1 capsule by mouth three times a day if needed for cough       Last Visit Date (If Applicable):  5/15/2024    Next Visit Date:    6/18/2024

## 2024-05-24 ENCOUNTER — TELEPHONE (OUTPATIENT)
Dept: PRIMARY CARE CLINIC | Age: 64
End: 2024-05-24

## 2024-05-24 NOTE — ASSESSMENT & PLAN NOTE
Patient may proceed with upcoming cataract surgery; no additional cardiovascular testing required at this time.  Attention should be paid to tamsulosin use and potential risk for floppy iris syndrome.  I don't believe patient's nausea should disrupt surgery but should be monitored.

## 2024-05-24 NOTE — TELEPHONE ENCOUNTER
Milana from Dr. Briscoe's office called requesting a call back before leave office today on pt's Pre Op Clearance.      Please call. Thank you.    [phone]

## 2024-05-24 NOTE — ASSESSMENT & PLAN NOTE
Uncertain etiology; will obtain labs, gallbladder/liver US.  Strongly suspect chronic fibrosis from alcohol abuse but liver enzymes have historically been normal; will assess liver synthetic function with PT/INR.  May require repeat EGD to further evaluate and for Swift's surveillance.  Continue omeprazole.  Offered Zofran PRN, patient declines at this time.

## 2024-05-24 NOTE — TELEPHONE ENCOUNTER
Pt's pre-op clearance was faxed to the number provided on the form along with last office note, medication list, allergy list, and list of diagnoses. I spoke with Milana from [facility] who stated that she will call the office if she does not receive it.

## 2024-05-28 ENCOUNTER — TELEPHONE (OUTPATIENT)
Dept: PRIMARY CARE CLINIC | Age: 64
End: 2024-05-28

## 2024-06-06 ENCOUNTER — TELEPHONE (OUTPATIENT)
Dept: PRIMARY CARE CLINIC | Age: 64
End: 2024-06-06

## 2024-06-06 NOTE — TELEPHONE ENCOUNTER
Pt was informed of his results per NB. Pt states that he is no longer feeling nauseated every day, but he is getting \"hot flashes\" that come with some nausea - this is happening \"once in a while\". The nausea that he was experiencing last week was worse and he was foggy. He explains that one night last week he had slept all night and woke up in his recliner, he fell asleep again in the recliner and woke up \"face down on the TV tray\" - he doesn't remember anything else.       Pt scheduled 6/7/24 @ 1pm.

## 2024-06-07 ENCOUNTER — OFFICE VISIT (OUTPATIENT)
Dept: PRIMARY CARE CLINIC | Age: 64
End: 2024-06-07
Payer: MEDICARE

## 2024-06-07 ENCOUNTER — TELEPHONE (OUTPATIENT)
Dept: PRIMARY CARE CLINIC | Age: 64
End: 2024-06-07

## 2024-06-07 VITALS
DIASTOLIC BLOOD PRESSURE: 78 MMHG | HEART RATE: 90 BPM | BODY MASS INDEX: 30.13 KG/M2 | OXYGEN SATURATION: 96 % | SYSTOLIC BLOOD PRESSURE: 122 MMHG | RESPIRATION RATE: 20 BRPM | WEIGHT: 192 LBS | HEIGHT: 67 IN

## 2024-06-07 DIAGNOSIS — E11.51 TYPE 2 DIABETES MELLITUS WITH DIABETIC PERIPHERAL ANGIOPATHY WITHOUT GANGRENE, WITHOUT LONG-TERM CURRENT USE OF INSULIN (HCC): Primary | ICD-10-CM

## 2024-06-07 DIAGNOSIS — I70.90 ATHEROSCLEROSIS: ICD-10-CM

## 2024-06-07 DIAGNOSIS — R11.0 NAUSEA: ICD-10-CM

## 2024-06-07 LAB — HBA1C MFR BLD: 6.2 %

## 2024-06-07 PROCEDURE — 99213 OFFICE O/P EST LOW 20 MIN: CPT | Performed by: STUDENT IN AN ORGANIZED HEALTH CARE EDUCATION/TRAINING PROGRAM

## 2024-06-07 PROCEDURE — 3074F SYST BP LT 130 MM HG: CPT | Performed by: STUDENT IN AN ORGANIZED HEALTH CARE EDUCATION/TRAINING PROGRAM

## 2024-06-07 PROCEDURE — 3078F DIAST BP <80 MM HG: CPT | Performed by: STUDENT IN AN ORGANIZED HEALTH CARE EDUCATION/TRAINING PROGRAM

## 2024-06-07 PROCEDURE — 83036 HEMOGLOBIN GLYCOSYLATED A1C: CPT | Performed by: STUDENT IN AN ORGANIZED HEALTH CARE EDUCATION/TRAINING PROGRAM

## 2024-06-07 PROCEDURE — 3044F HG A1C LEVEL LT 7.0%: CPT | Performed by: STUDENT IN AN ORGANIZED HEALTH CARE EDUCATION/TRAINING PROGRAM

## 2024-06-07 RX ORDER — PREDNISOLONE ACETATE 10 MG/ML
1 SUSPENSION/ DROPS OPHTHALMIC 2 TIMES DAILY
COMMUNITY
Start: 2024-05-28

## 2024-06-07 NOTE — TELEPHONE ENCOUNTER
PATIENT CALLING IN REGARDING STOOL SOFTENER, ST ATED THE NAME SAY DOCUSATE SODIUM 100MG STOOL SOFTENER

## 2024-06-07 NOTE — PROGRESS NOTES
[facility]      Date of Visit:  2024  Patient Name: Jai Walters   Patient :  1960     CHIEF COMPLAINT:     Jai Walters is a 64 y.o. male who presents today to be evaluated for the following condition(s):  Chief Complaint   Patient presents with    Memory Loss     Pt has been having episodes of memory loss that started approx 2-3 months ago, but is worsening. Pt states that about 2 days ago he woke up groggy, walked to his recliner, fell asleep again, and woke up face down on his TV tray - he does not remember anything else.     Follow-up     Pt had R cataract surgery 24 with Dr. Briscoe. He states that he is feeling well and can see better. No pain medication, only Rx eye drops.     Diabetes     Pt is currently taking daily. Pt does check his BG with his CGM. Average readings are in the 150s. Pt's last hemoglobin A1c was 6.1 on 24. Pt's A1c today is 6.2. No concerns.        HISTORY OF PRESENT ILLNESS:      HPI     REVIEW OF SYSTEMS:     Review of Systems   Constitutional:  Negative for chills, fatigue and fever.   Respiratory:  Negative for cough, shortness of breath and wheezing.    Cardiovascular:  Negative for chest pain, palpitations and leg swelling.   Gastrointestinal:  Negative for abdominal pain, blood in stool, constipation, diarrhea, nausea and vomiting.   Neurological:  Negative for dizziness, weakness, light-headedness, numbness and headaches.   Psychiatric/Behavioral:  Positive for confusion.         REVIEWED INFORMATION:      Current Outpatient Medications on File Prior to Visit   Medication Sig Dispense Refill    prednisoLONE acetate (PRED FORTE) 1 % ophthalmic suspension Place 1 drop into the right eye in the morning and at bedtime      benzonatate (TESSALON) 100 MG capsule take 1 capsule by mouth three times a day if needed for cough 30 capsule 0    TRELEGY ELLIPTA 200-62.5-25 MCG/ACT AEPB inhaler inhale 1 puff by mouth and INTO THE LUNGS once daily 60 each 5

## 2024-06-10 ENCOUNTER — TELEPHONE (OUTPATIENT)
Dept: PRIMARY CARE CLINIC | Age: 64
End: 2024-06-10

## 2024-06-10 NOTE — TELEPHONE ENCOUNTER
Denise from [facility] called with questions regarding pt's order for US Abdominal Aorta Limited.     Has pt had a previous US AAA for medicare?    Please call Denise.    [phone] press option 1

## 2024-06-12 NOTE — TELEPHONE ENCOUNTER
Denise just wanted to clarify if the AAA was covering an HM topic or if it was due to sx or dx. Office note said that it was due to \"atherosclerosis\".

## 2024-06-14 ENCOUNTER — TELEPHONE (OUTPATIENT)
Dept: PRIMARY CARE CLINIC | Age: 64
End: 2024-06-14

## 2024-06-14 NOTE — TELEPHONE ENCOUNTER
Pt will be having this test performed outside of [facility] at [facility] for Monday 6/17/24.

## 2024-06-17 NOTE — TELEPHONE ENCOUNTER
Jai Walters is calling to request a refill on the following medication(s):    Medication Request:  Requested Prescriptions     Pending Prescriptions Disp Refills    omeprazole (PRILOSEC) 40 MG delayed release capsule [Pharmacy Med Name: OMEPRAZOLE DR 40 MG CAPSULE] 180 capsule 0     Sig: take 1 capsule by mouth twice a day       Last Visit Date (If Applicable):  6/7/2024    Next Visit Date:    6/18/2024

## 2024-06-18 ENCOUNTER — OFFICE VISIT (OUTPATIENT)
Dept: PRIMARY CARE CLINIC | Age: 64
End: 2024-06-18
Payer: MEDICARE

## 2024-06-18 VITALS
HEIGHT: 67 IN | WEIGHT: 187.8 LBS | OXYGEN SATURATION: 96 % | SYSTOLIC BLOOD PRESSURE: 118 MMHG | HEART RATE: 102 BPM | DIASTOLIC BLOOD PRESSURE: 60 MMHG | BODY MASS INDEX: 29.47 KG/M2

## 2024-06-18 DIAGNOSIS — L21.9 SEBORRHEIC DERMATITIS: ICD-10-CM

## 2024-06-18 DIAGNOSIS — K29.50 CHRONIC GASTRITIS WITHOUT BLEEDING, UNSPECIFIED GASTRITIS TYPE: ICD-10-CM

## 2024-06-18 DIAGNOSIS — I70.90 ATHEROSCLEROSIS: ICD-10-CM

## 2024-06-18 DIAGNOSIS — R11.0 NAUSEA: Primary | ICD-10-CM

## 2024-06-18 DIAGNOSIS — K22.70 BARRETT'S ESOPHAGUS WITHOUT DYSPLASIA: ICD-10-CM

## 2024-06-18 PROBLEM — F10.10 ALCOHOL ABUSE: Status: RESOLVED | Noted: 2024-05-21 | Resolved: 2024-06-18

## 2024-06-18 PROBLEM — Z98.41 STATUS POST CATARACT EXTRACTION AND INSERTION OF INTRAOCULAR LENS, RIGHT: Status: ACTIVE | Noted: 2024-05-29

## 2024-06-18 PROBLEM — Z96.1 STATUS POST CATARACT EXTRACTION AND INSERTION OF INTRAOCULAR LENS, RIGHT: Status: ACTIVE | Noted: 2024-05-29

## 2024-06-18 PROCEDURE — 3074F SYST BP LT 130 MM HG: CPT | Performed by: STUDENT IN AN ORGANIZED HEALTH CARE EDUCATION/TRAINING PROGRAM

## 2024-06-18 PROCEDURE — 3078F DIAST BP <80 MM HG: CPT | Performed by: STUDENT IN AN ORGANIZED HEALTH CARE EDUCATION/TRAINING PROGRAM

## 2024-06-18 PROCEDURE — 99213 OFFICE O/P EST LOW 20 MIN: CPT | Performed by: STUDENT IN AN ORGANIZED HEALTH CARE EDUCATION/TRAINING PROGRAM

## 2024-06-18 ASSESSMENT — ENCOUNTER SYMPTOMS
CONSTIPATION: 0
NAUSEA: 1
WHEEZING: 0
SHORTNESS OF BREATH: 0
ABDOMINAL PAIN: 0
DIARRHEA: 0
BLOOD IN STOOL: 0
VOMITING: 0
COUGH: 0

## 2024-06-18 NOTE — PROGRESS NOTES
[facility]      Date of Visit:  2024  Patient Name: Jai Walters   Patient :  1960     CHIEF COMPLAINT:     Jai Walters is a 64 y.o. male who presents today to be evaluated for the following condition(s):  Chief Complaint   Patient presents with    Follow-up     Pt had an US AAA done on 24 in [facility]. This was ordered due to Atherosclerosis.        HISTORY OF PRESENT ILLNESS:      HPI   Patient is presenting today for follow up.    Nausea; significantly improved; still has episodes infrequently but denies any vomiting, abdominal pain.  He is scheduled to see Dr. Goetz tomorrow to discuss surveillance of Swift's, chronic gastritis with focal intestinal metaplasia noted on EGD 2023.  No melena, hematochezia or stool changes.    Recent US AAA results reviewed with patient; normal without evidence of AAA.    REVIEW OF SYSTEMS:     Review of Systems   Constitutional:  Negative for chills, fatigue and fever.   Respiratory:  Negative for cough, shortness of breath and wheezing.    Cardiovascular:  Negative for chest pain, palpitations and leg swelling.   Gastrointestinal:  Positive for nausea. Negative for abdominal pain, blood in stool, constipation, diarrhea and vomiting.   Neurological:  Negative for dizziness, weakness, light-headedness, numbness and headaches.        REVIEWED INFORMATION:      Current Outpatient Medications on File Prior to Visit   Medication Sig Dispense Refill    prednisoLONE acetate (PRED FORTE) 1 % ophthalmic suspension Place 1 drop into the right eye in the morning and at bedtime      benzonatate (TESSALON) 100 MG capsule take 1 capsule by mouth three times a day if needed for cough 30 capsule 0    TRELEGY ELLIPTA 200-62.5-25 MCG/ACT AEPB inhaler inhale 1 puff by mouth and INTO THE LUNGS once daily 60 each 5    lamoTRIgine (LAMICTAL) 150 MG tablet Take 1 tablet by mouth daily      empagliflozin (JARDIANCE) 25 MG tablet Take 1 tablet by mouth daily 90 tablet 1

## 2024-06-19 ENCOUNTER — TELEPHONE (OUTPATIENT)
Dept: PRIMARY CARE CLINIC | Age: 64
End: 2024-06-19

## 2024-06-19 RX ORDER — OMEPRAZOLE 40 MG/1
40 CAPSULE, DELAYED RELEASE ORAL 2 TIMES DAILY
Qty: 180 CAPSULE | Refills: 0 | Status: SHIPPED | OUTPATIENT
Start: 2024-06-19

## 2024-06-19 RX ORDER — KETOCONAZOLE 20 MG/ML
SHAMPOO TOPICAL
Qty: 120 ML | Refills: 1 | Status: SHIPPED | OUTPATIENT
Start: 2024-06-19

## 2024-06-19 NOTE — TELEPHONE ENCOUNTER
LOV:6/18/2024  NOV:none    Gale from [facility] is requesting surgical clearance from cataract surgery-    F [phone]

## 2024-06-19 NOTE — TELEPHONE ENCOUNTER
Jai called wanting you to know he scheduled the scopy procedure for July 9th.  He is waiting for his to refills to [facility] [place].  After that his meds will go to [facility] in [facility].

## 2024-06-19 NOTE — TELEPHONE ENCOUNTER
[facility] general surg called and needed a copy of last office visit due to a procedure scheduled in July.   OV note printed and faxed to [facility] general surgery clinic.    FAX # [phone]

## 2024-06-19 NOTE — TELEPHONE ENCOUNTER
I spoke with surgery scheduling at [facility] and was told that [facility] has not done cataract or any eye surgeries in about 4 years. The pt is only anticipating having another eye surgery in July. No contact number or extension was left for Gale so unable to speak with her directly. I called [facility], where pt had eye surgery, and they do not need surgical clearance at this time - will reach out if needed.

## 2024-06-19 NOTE — TELEPHONE ENCOUNTER
Patient was seen by Dr. Goetz today to discuss scheduling EGD for surveillance of Swift's esophagus.  I'm assuming General Surgery wants previous surgical clearance to have on file.  I do not have any objections to patient proceeding with EGD, in fact, I think he needs this.  No further workup or testing needs required for clearance on my end.

## 2024-06-20 NOTE — TELEPHONE ENCOUNTER
FYI:    Faxed notes from 05/15/24 for pt to Gale at [facility], that had clearance for Cataract surgery.    Fax#:    [phone]

## 2024-06-23 PROBLEM — Z01.818 PREOPERATIVE EXAMINATION: Status: RESOLVED | Noted: 2023-12-11 | Resolved: 2024-06-23

## 2024-06-25 DIAGNOSIS — G25.0 ESSENTIAL TREMOR: ICD-10-CM

## 2024-06-25 RX ORDER — AMLODIPINE BESYLATE 5 MG/1
5 TABLET ORAL DAILY
Qty: 90 TABLET | Refills: 3 | Status: SHIPPED | OUTPATIENT
Start: 2024-06-25

## 2024-06-25 RX ORDER — BENZONATATE 100 MG/1
CAPSULE ORAL
Qty: 30 CAPSULE | Refills: 2 | Status: SHIPPED | OUTPATIENT
Start: 2024-06-25

## 2024-06-25 RX ORDER — GABAPENTIN 100 MG/1
200 CAPSULE ORAL NIGHTLY
Qty: 60 CAPSULE | Refills: 2 | Status: SHIPPED | OUTPATIENT
Start: 2024-06-25 | End: 2024-09-23

## 2024-06-25 RX ORDER — OLANZAPINE 7.5 MG/1
7.5 TABLET, FILM COATED ORAL NIGHTLY
Qty: 30 TABLET | Refills: 2 | OUTPATIENT
Start: 2024-06-25

## 2024-06-25 RX ORDER — LISINOPRIL 40 MG/1
TABLET ORAL
Qty: 90 TABLET | Refills: 3 | Status: SHIPPED | OUTPATIENT
Start: 2024-06-25

## 2024-06-25 NOTE — TELEPHONE ENCOUNTER
Last Visit Date: 6/18/2024   Next Visit Date: 9/18/2024     Pt is asking for a refill on this medication due to his chronic cough.

## 2024-06-26 ENCOUNTER — TELEPHONE (OUTPATIENT)
Dept: PRIMARY CARE CLINIC | Age: 64
End: 2024-06-26

## 2024-06-26 NOTE — TELEPHONE ENCOUNTER
Patient states that he had to lower his Jardiance from 25 mg to 10 mg due to sugars dropping to low.   Patient states that it would drop to 50-56.

## 2024-06-27 NOTE — TELEPHONE ENCOUNTER
I am ok with decreased dose.  Has hypoglycemia resolved?  Please adjust medication list to accurately reflect dose and send Rx for 10  mg dose if needed.

## 2024-06-28 NOTE — TELEPHONE ENCOUNTER
Pt states that his BG is around 70, but it is tolerable - he is not experiencing any sx of hypoglycemia. He would like to continue the 10mg and does not need a refill at this time. He was advised to call the office if he started experiencing sx or wanted to schedule and appt.

## 2024-07-01 RX ORDER — SIMVASTATIN 40 MG
40 TABLET ORAL DAILY
Qty: 90 TABLET | Refills: 3 | OUTPATIENT
Start: 2024-07-01

## 2024-07-01 NOTE — TELEPHONE ENCOUNTER
Last Visit Date: 6/18/2024   Next Visit Date: 9/18/2024     Medication is not due for a refill at this time.

## 2024-07-06 RX ORDER — FAMOTIDINE 40 MG/1
40 TABLET, FILM COATED ORAL NIGHTLY
Qty: 90 TABLET | Refills: 3 | Status: SHIPPED | OUTPATIENT
Start: 2024-07-06

## 2024-07-08 ASSESSMENT — ENCOUNTER SYMPTOMS
CONSTIPATION: 0
VOMITING: 0
BLOOD IN STOOL: 0
SHORTNESS OF BREATH: 0
ABDOMINAL PAIN: 0
NAUSEA: 0
WHEEZING: 0
COUGH: 0
DIARRHEA: 0

## 2024-07-11 RX ORDER — ALBUTEROL SULFATE 90 UG/1
AEROSOL, METERED RESPIRATORY (INHALATION)
Qty: 18 G | Refills: 5 | Status: SHIPPED | OUTPATIENT
Start: 2024-07-11

## 2024-07-11 NOTE — TELEPHONE ENCOUNTER
Jai Walters is requesting a refill on the following medication(s):  Requested Prescriptions     Pending Prescriptions Disp Refills    albuterol sulfate HFA (PROVENTIL;VENTOLIN;PROAIR) 108 (90 Base) MCG/ACT inhaler 18 g 0     Si puffs as needed       Last Visit Date (If Applicable):  2024    Next Visit Date:    2024

## 2024-07-15 ENCOUNTER — TELEPHONE (OUTPATIENT)
Dept: PRIMARY CARE CLINIC | Age: 64
End: 2024-07-15

## 2024-07-15 NOTE — TELEPHONE ENCOUNTER
Pt states that his BG has been staying around 60-70, causing his sensor to alarm. Pt stopped taking the Jardiance 2 days ago to keep the numbers from dropping too low. He denies hypoglycemic sx and has not checked his BG with his manual glucometer when this happens.     Please advise.

## 2024-07-15 NOTE — TELEPHONE ENCOUNTER
Last appt 06/18/24  Next appt 09/18/24      Pt called to inform PCP he had to discontinue the Jardience 10 mg due to his \"sugar was bottoming out\".    He knows he has tried different medications already but he is asking if there is another alternative.    Please advise. Thank you.    He would like a call back please.

## 2024-07-17 NOTE — TELEPHONE ENCOUNTER
Please have patient avoid prolonged fasting.  Recommend having him check CGM accuracy with fingerstick; will not be the same but should be close; CGM lags fingerstick by 15 minutes or so.  I would not recommend replacing Jardiance if BG is running this low.

## 2024-07-25 DIAGNOSIS — L21.9 SEBORRHEIC DERMATITIS: ICD-10-CM

## 2024-07-25 RX ORDER — KETOCONAZOLE 20 MG/ML
SHAMPOO TOPICAL
Qty: 120 ML | Refills: 1 | Status: SHIPPED | OUTPATIENT
Start: 2024-07-25

## 2024-07-25 NOTE — TELEPHONE ENCOUNTER
Jai Walters is calling to request a refill on the following medication(s):    Last Visit Date (If Applicable):  6/18/2024    Next Visit Date:    9/17/2024    Medication Request:  Requested Prescriptions     Pending Prescriptions Disp Refills    ketoconazole (NIZORAL) 2 % shampoo 120 mL 1     Sig: apply 5 to 10 MILLILITERS to scalp and LATHER AND LEAVE ON FOR 3 TO 5 MINUTES two times a week for 4 weeks

## 2024-07-30 ENCOUNTER — TELEPHONE (OUTPATIENT)
Dept: PRIMARY CARE CLINIC | Age: 64
End: 2024-07-30

## 2024-07-30 NOTE — TELEPHONE ENCOUNTER
Last appt 06/18/24  Next appt 09/17/24      Bernie MONROY From [facility] called in regards to pt's Ultrasound abdominal aorta limited.    The insurance kicked it back and she is asking if there are any more diagnostic codes that can be added to help push it through.    Please advise. Thank you.    There are two Bernie's so say Bernie MONROY

## 2024-08-05 RX ORDER — AMLODIPINE BESYLATE 5 MG/1
5 TABLET ORAL DAILY
Qty: 90 TABLET | Refills: 0 | Status: SHIPPED | OUTPATIENT
Start: 2024-08-05

## 2024-08-16 ENCOUNTER — TELEPHONE (OUTPATIENT)
Dept: PRIMARY CARE CLINIC | Age: 64
End: 2024-08-16

## 2024-08-16 DIAGNOSIS — R41.3 MEMORY CHANGE: Primary | ICD-10-CM

## 2024-08-16 NOTE — TELEPHONE ENCOUNTER
Pt called with more concerns re: the possibility of dementia. He stated that he had discussed having an MRI at his LOV and would like to have this done. He has gotten in his car to drive somewhere, but can not remember where he was going so he has to turn around and go back home; he picks up the phone to call someone and forgets who so hangs up; his wife is starting to point out more concerns. He would like to have this MRI done soon so he can review the results at his FOV, he said that it is okay if it can not be ordered until he is seen on 9/17/24.     Please advise.

## 2024-08-19 ENCOUNTER — TELEPHONE (OUTPATIENT)
Dept: PRIMARY CARE CLINIC | Age: 64
End: 2024-08-19

## 2024-08-19 NOTE — TELEPHONE ENCOUNTER
Last appt  06/18/24  Next appt 09/17/24      Pt called stating he needs the \"extender\" for his inhaler. He was not sure what it is called.    Please advise. Thank you    Send to [facility]

## 2024-08-21 NOTE — TELEPHONE ENCOUNTER
Pt called requesting a spacer for his inhaler. This was called into the [facility] pharmacy.

## 2024-08-21 NOTE — TELEPHONE ENCOUNTER
Brionna with [facility] called requesting clinical notes for pt's upcoming MRI.    Fax#     [phone]

## 2024-08-21 NOTE — TELEPHONE ENCOUNTER
Pt called with fax # for [facility]   [phone]    I will fax order over.

## 2024-09-04 DIAGNOSIS — E11.649 TYPE 2 DIABETES MELLITUS WITH HYPOGLYCEMIA WITHOUT COMA, WITHOUT LONG-TERM CURRENT USE OF INSULIN (HCC): ICD-10-CM

## 2024-09-04 DIAGNOSIS — G25.0 ESSENTIAL TREMOR: ICD-10-CM

## 2024-09-04 NOTE — TELEPHONE ENCOUNTER
Last Visit Date: 6/18/2024   Next Visit Date: 9/17/2024     Pt asked for refills to go to [facility] pharmacy - he was not happy with [facility]. He also, wanted NB to know that he is scheduled to have his MRI done tomorrow, 9/5/24, at [facility].

## 2024-09-05 RX ORDER — LANCETS 30 GAUGE
1 EACH MISCELLANEOUS 2 TIMES DAILY
Qty: 100 EACH | Refills: 0 | Status: SHIPPED | OUTPATIENT
Start: 2024-09-05

## 2024-09-05 RX ORDER — GABAPENTIN 100 MG/1
200 CAPSULE ORAL NIGHTLY
Qty: 60 CAPSULE | Refills: 2 | Status: SHIPPED | OUTPATIENT
Start: 2024-09-05 | End: 2024-12-04

## 2024-09-05 RX ORDER — OMEPRAZOLE 40 MG/1
40 CAPSULE, DELAYED RELEASE ORAL 2 TIMES DAILY
Qty: 180 CAPSULE | Refills: 0 | Status: SHIPPED | OUTPATIENT
Start: 2024-09-05

## 2024-09-05 RX ORDER — OLANZAPINE 7.5 MG/1
7.5 TABLET, FILM COATED ORAL NIGHTLY
Qty: 30 TABLET | Refills: 0 | OUTPATIENT
Start: 2024-09-05

## 2024-09-05 RX ORDER — GLUCOSAMINE HCL/CHONDROITIN SU 500-400 MG
CAPSULE ORAL
Qty: 100 STRIP | Refills: 1 | Status: SHIPPED | OUTPATIENT
Start: 2024-09-05

## 2024-09-05 RX ORDER — PEN NEEDLE, DIABETIC 31 GX5/16"
2 NEEDLE, DISPOSABLE MISCELLANEOUS 2 TIMES DAILY
Qty: 100 EACH | Refills: 1 | Status: SHIPPED | OUTPATIENT
Start: 2024-09-05

## 2024-09-05 NOTE — TELEPHONE ENCOUNTER
Olanzapine needs prescribed by psychiatrist.  Could we please place a reminder to call for MRI results if not received by Friday at noon?  Thanks.

## 2024-09-17 ENCOUNTER — OFFICE VISIT (OUTPATIENT)
Dept: PRIMARY CARE CLINIC | Age: 64
End: 2024-09-17
Payer: MEDICARE

## 2024-09-17 VITALS
DIASTOLIC BLOOD PRESSURE: 68 MMHG | HEIGHT: 67 IN | BODY MASS INDEX: 29.32 KG/M2 | OXYGEN SATURATION: 98 % | WEIGHT: 186.8 LBS | SYSTOLIC BLOOD PRESSURE: 112 MMHG | HEART RATE: 97 BPM

## 2024-09-17 DIAGNOSIS — Z23 NEED FOR VACCINATION: ICD-10-CM

## 2024-09-17 DIAGNOSIS — J45.40 MODERATE PERSISTENT ASTHMA WITHOUT COMPLICATION: ICD-10-CM

## 2024-09-17 DIAGNOSIS — R41.3 MEMORY CHANGE: ICD-10-CM

## 2024-09-17 DIAGNOSIS — J43.9 PULMONARY EMPHYSEMA, UNSPECIFIED EMPHYSEMA TYPE (HCC): ICD-10-CM

## 2024-09-17 DIAGNOSIS — E11.649 TYPE 2 DIABETES MELLITUS WITH HYPOGLYCEMIA WITHOUT COMA, WITHOUT LONG-TERM CURRENT USE OF INSULIN (HCC): Primary | ICD-10-CM

## 2024-09-17 LAB — HBA1C MFR BLD: 6.4 %

## 2024-09-17 PROCEDURE — 99214 OFFICE O/P EST MOD 30 MIN: CPT | Performed by: STUDENT IN AN ORGANIZED HEALTH CARE EDUCATION/TRAINING PROGRAM

## 2024-09-17 PROCEDURE — 90471 IMMUNIZATION ADMIN: CPT | Performed by: STUDENT IN AN ORGANIZED HEALTH CARE EDUCATION/TRAINING PROGRAM

## 2024-09-17 PROCEDURE — 90653 IIV ADJUVANT VACCINE IM: CPT | Performed by: STUDENT IN AN ORGANIZED HEALTH CARE EDUCATION/TRAINING PROGRAM

## 2024-09-17 PROCEDURE — 3074F SYST BP LT 130 MM HG: CPT | Performed by: STUDENT IN AN ORGANIZED HEALTH CARE EDUCATION/TRAINING PROGRAM

## 2024-09-17 PROCEDURE — G0008 ADMIN INFLUENZA VIRUS VAC: HCPCS | Performed by: STUDENT IN AN ORGANIZED HEALTH CARE EDUCATION/TRAINING PROGRAM

## 2024-09-17 PROCEDURE — 3078F DIAST BP <80 MM HG: CPT | Performed by: STUDENT IN AN ORGANIZED HEALTH CARE EDUCATION/TRAINING PROGRAM

## 2024-09-17 PROCEDURE — 3044F HG A1C LEVEL LT 7.0%: CPT | Performed by: STUDENT IN AN ORGANIZED HEALTH CARE EDUCATION/TRAINING PROGRAM

## 2024-09-17 PROCEDURE — 83036 HEMOGLOBIN GLYCOSYLATED A1C: CPT | Performed by: STUDENT IN AN ORGANIZED HEALTH CARE EDUCATION/TRAINING PROGRAM

## 2024-09-17 PROCEDURE — 90715 TDAP VACCINE 7 YRS/> IM: CPT | Performed by: STUDENT IN AN ORGANIZED HEALTH CARE EDUCATION/TRAINING PROGRAM

## 2024-09-17 RX ORDER — BUSPIRONE HYDROCHLORIDE 5 MG/1
5 TABLET ORAL 2 TIMES DAILY
COMMUNITY
Start: 2024-09-12

## 2024-09-17 NOTE — PROGRESS NOTES
[facility]      Date of Visit:  2024  Patient Name: Jai Walters   Patient :  1960     CHIEF COMPLAINT:     Jai Walters is a 64 y.o. male who presents today to be evaluated for the following condition(s):  Chief Complaint   Patient presents with    Diabetes     Pt stopped taking Jardiance 25mg daily due to hypoglycemic episodes. He does check his BG at home daily with the use of his CGM. Pt's numbers have been slightly higher than he would like because of his diet - yogurt and ice cream. Pt's last hgb A1c was 6.2 on 24. Pt had a DM retinal exam - [facility].     COPD     Pt uses albuterol and Trelegy inhalers PRN. Trelegy is daily, but he states that he very rarely uses the albuterol inhaler. He states that his breathing has been breathing better than at LOV. Pt is still using Tessalon daily for cough.        HISTORY OF PRESENT ILLNESS:      HPI   Patient is presenting today for follow-up.    Overall, patient reports that he is doing well.  He denies any new medical concerns today.    Type 2 diabetes; patient recently discontinued Jardiance secondary to recurrent hypoglycemic episodes.  Patient continues to monitor his blood glucose with his freestyle jairo 2 system; data reviewed today.  Patient's blood glucose is excellent control with diet alone.  Hemoglobin A1c is 6.4 today, stable from 6.2 in .  Patient recently underwent diabetic eye exam at [facility]; no evidence of diabetic retinopathy per his report.    COPD/asthma; patient reports compliance with Trelegy, albuterol as needed.  He notes rare albuterol use.  He denies any coughing, wheezing.  He continues with home oxygen at 2 L/min.  Patient has been continuing with Tessalon Perles; discussed that this was intended to be a temporary medication for symptoms during last exacerbation but not for chronic use; he will discontinue.  Last chest CT at [facility] in 2023; discussed lung cancer screening with updated

## 2024-09-30 ASSESSMENT — ENCOUNTER SYMPTOMS
ABDOMINAL PAIN: 0
SHORTNESS OF BREATH: 1
NAUSEA: 0
DIARRHEA: 0
VOMITING: 0
CONSTIPATION: 0
WHEEZING: 0
BLOOD IN STOOL: 0
COUGH: 1

## 2024-10-07 ENCOUNTER — TELEPHONE (OUTPATIENT)
Dept: PRIMARY CARE CLINIC | Age: 64
End: 2024-10-07

## 2024-10-07 NOTE — TELEPHONE ENCOUNTER
Pt called to report his BG readings to provider. He states that his readings are ranging from 120s - low 200s; he feels best when his BG is around 180. The readings are too varied for him and he would like to start a medication to get his readings more stabilized. He denies sx of hyper or hypoglycemia - he has started eating a light snack before bed to prevent his BG from dropping too low.

## 2024-10-09 NOTE — TELEPHONE ENCOUNTER
Pt said that his BG starts off \"okay\" when he wakes up, but it starts to increase \"on it's own\" without him eating anything. I asked him what he was eating at night before bed and he said that sometimes he will eat peanut butter crackers or yogurt. I explained the hesitancy regarding the medication and he asked if there was something low-dose he could start to help keep his BG down. I told him I would send the message to Dr. Power, but we may need to schedule him an appt.

## 2024-10-09 NOTE — TELEPHONE ENCOUNTER
Please have patient schedule an appointment to discuss.  His body breaks down storage forms of glucose as his blood glucose decreases while fasting which will elevate his blood glucose.  Since he has diabetes, his blood glucose may then be elevated as the compensatory mechanism to keep his blood glucose in the normal range does not work as well as someone without diabetes; ie he's insulin resistant.

## 2024-10-14 NOTE — TELEPHONE ENCOUNTER
Patient states he can't make an appointment because of the driving back and forth. Patient states he will wait till his next appointment with you in December.   Patient states he was started on Benztropine 0.5 mg took off olanzapine at [facility]

## 2024-11-08 RX ORDER — AMLODIPINE BESYLATE 5 MG/1
5 TABLET ORAL DAILY
Qty: 90 TABLET | Refills: 0 | Status: SHIPPED | OUTPATIENT
Start: 2024-11-08

## 2024-11-08 NOTE — TELEPHONE ENCOUNTER
Jai Walters is calling to request a refill on the following medication(s):    Last Visit Date (If Applicable):  9/17/2024    Next Visit Date:    12/19/2024    Medication Request:  Requested Prescriptions     Pending Prescriptions Disp Refills    amLODIPine (NORVASC) 5 MG tablet 90 tablet 0     Sig: Take 1 tablet by mouth daily

## 2024-11-13 DIAGNOSIS — N40.1 BENIGN PROSTATIC HYPERPLASIA WITH WEAK URINARY STREAM: ICD-10-CM

## 2024-11-13 DIAGNOSIS — R39.12 BENIGN PROSTATIC HYPERPLASIA WITH WEAK URINARY STREAM: ICD-10-CM

## 2024-11-13 NOTE — TELEPHONE ENCOUNTER
Last Visit:  9/17/2024     Next Visit Date:  Future Appointments   Date Time Provider Department Center   12/19/2024 10:00 AM Kameron Power DO [place] [facility]

## 2024-11-15 RX ORDER — TAMSULOSIN HYDROCHLORIDE 0.4 MG/1
0.4 CAPSULE ORAL DAILY
Qty: 90 CAPSULE | Refills: 3 | Status: SHIPPED | OUTPATIENT
Start: 2024-11-15

## 2024-11-19 ENCOUNTER — TELEPHONE (OUTPATIENT)
Dept: PRIMARY CARE CLINIC | Age: 64
End: 2024-11-19

## 2024-11-19 NOTE — TELEPHONE ENCOUNTER
Jai Walters called to let RSV at the pharmacy in [facility]. Documented in patient chart.

## 2024-11-20 DIAGNOSIS — J43.9 PULMONARY EMPHYSEMA, UNSPECIFIED EMPHYSEMA TYPE (HCC): ICD-10-CM

## 2024-11-20 DIAGNOSIS — J96.11 CHRONIC RESPIRATORY FAILURE WITH HYPOXIA: ICD-10-CM

## 2024-11-20 NOTE — TELEPHONE ENCOUNTER
Last Visit:  9/17/2024     Next Visit Date:  Future Appointments   Date Time Provider Department Center   12/19/2024 10:00 AM Kameron Power DO [place] [facility] DE[facility]

## 2024-11-21 RX ORDER — FLUTICASONE FUROATE, UMECLIDINIUM BROMIDE AND VILANTEROL TRIFENATATE 200; 62.5; 25 UG/1; UG/1; UG/1
1 POWDER RESPIRATORY (INHALATION) DAILY
Qty: 180 EACH | Refills: 1 | Status: SHIPPED | OUTPATIENT
Start: 2024-11-21

## 2024-12-04 NOTE — TELEPHONE ENCOUNTER
Jai Walters is requesting a refill on the following medication(s):  Requested Prescriptions     Pending Prescriptions Disp Refills    simvastatin (ZOCOR) 40 MG tablet [Pharmacy Med Name: simvastatin 40 mg tablet] 90 tablet 0     Sig: TAKE ONE TABLET BY MOUTH ONCE DAILY       Last Visit Date (If Applicable):  9/17/2024    Next Visit Date:    12/19/2024

## 2024-12-05 ENCOUNTER — TELEPHONE (OUTPATIENT)
Dept: PRIMARY CARE CLINIC | Age: 64
End: 2024-12-05

## 2024-12-05 DIAGNOSIS — M15.0 PRIMARY OSTEOARTHRITIS INVOLVING MULTIPLE JOINTS: Primary | ICD-10-CM

## 2024-12-05 RX ORDER — CICLOPIROX OLAMINE 7.7 MG/G
CREAM TOPICAL
Status: CANCELLED | OUTPATIENT
Start: 2024-12-05

## 2024-12-05 NOTE — TELEPHONE ENCOUNTER
Patient called about his arthritis cream refill he had filled at [facility] prior to closing. [facility] never transferred the script he is asking if we could please send it to his pharmacy to be filled. Last script was filled at [facility]    Please Advise.      VIRAJ Gibbs

## 2024-12-06 RX ORDER — SIMVASTATIN 40 MG
40 TABLET ORAL DAILY
Qty: 90 TABLET | Refills: 3 | Status: SHIPPED | OUTPATIENT
Start: 2024-12-06

## 2024-12-31 RX ORDER — OMEPRAZOLE 40 MG/1
40 CAPSULE, DELAYED RELEASE ORAL 2 TIMES DAILY
Qty: 180 CAPSULE | Refills: 0 | Status: SHIPPED | OUTPATIENT
Start: 2024-12-31

## 2025-01-02 ENCOUNTER — TELEPHONE (OUTPATIENT)
Dept: PRIMARY CARE CLINIC | Age: 65
End: 2025-01-02

## 2025-01-02 NOTE — TELEPHONE ENCOUNTER
What else has patient tried?  Any associated symptoms?  Would recommend bisacodyl suppository if has not been tried; may also consider tap water enema.

## 2025-01-02 NOTE — TELEPHONE ENCOUNTER
Hospital said patient was backed up,the hospital did imagine, patient was given  lactulose  15 mg, patient was only able to have bowl movement stating very little and hard.  Patient wants to know if there is anything else he can take.

## 2025-01-06 NOTE — TELEPHONE ENCOUNTER
Pt has been taking stool softeners and \"a powder\" since Friday, 1/3/24. He is unable to do a suppository because it starts melting in his hand before he can get it inserted, the enema he is unable to reach and his wife can not do it.

## 2025-01-07 DIAGNOSIS — G25.0 ESSENTIAL TREMOR: ICD-10-CM

## 2025-01-07 RX ORDER — GABAPENTIN 100 MG/1
CAPSULE ORAL
Qty: 60 CAPSULE | Refills: 2 | Status: SHIPPED | OUTPATIENT
Start: 2025-01-07 | End: 2025-04-07

## 2025-01-07 NOTE — TELEPHONE ENCOUNTER
Jai Walters is requesting a refill on the following medication(s):  Requested Prescriptions     Pending Prescriptions Disp Refills    gabapentin (NEURONTIN) 100 MG capsule [Pharmacy Med Name: gabapentin 100 mg capsule] 60 capsule 2     Sig: TAKE TWO CAPSULES BY MOUTH AT BEDTIME       Last Visit Date (If Applicable):  9/17/2024    Next Visit Date:    1/14/2025

## 2025-01-14 ENCOUNTER — OFFICE VISIT (OUTPATIENT)
Dept: PRIMARY CARE CLINIC | Age: 65
End: 2025-01-14
Payer: MEDICARE

## 2025-01-14 ENCOUNTER — HOSPITAL ENCOUNTER (OUTPATIENT)
Age: 65
Setting detail: SPECIMEN
Discharge: HOME | End: 2025-01-14

## 2025-01-14 VITALS
SYSTOLIC BLOOD PRESSURE: 108 MMHG | BODY MASS INDEX: 29.1 KG/M2 | HEIGHT: 67 IN | OXYGEN SATURATION: 97 % | WEIGHT: 185.4 LBS | HEART RATE: 85 BPM | DIASTOLIC BLOOD PRESSURE: 58 MMHG

## 2025-01-14 DIAGNOSIS — F31.9 BIPOLAR 1 DISORDER (HCC): ICD-10-CM

## 2025-01-14 DIAGNOSIS — Z11.59 ENCOUNTER FOR HEPATITIS C SCREENING TEST FOR LOW RISK PATIENT: ICD-10-CM

## 2025-01-14 DIAGNOSIS — M15.0 PRIMARY OSTEOARTHRITIS INVOLVING MULTIPLE JOINTS: ICD-10-CM

## 2025-01-14 DIAGNOSIS — L21.9 SEBORRHEIC DERMATITIS: ICD-10-CM

## 2025-01-14 DIAGNOSIS — E11.649 TYPE 2 DIABETES MELLITUS WITH HYPOGLYCEMIA WITHOUT COMA, WITHOUT LONG-TERM CURRENT USE OF INSULIN (HCC): ICD-10-CM

## 2025-01-14 DIAGNOSIS — J43.9 PULMONARY EMPHYSEMA, UNSPECIFIED EMPHYSEMA TYPE (HCC): ICD-10-CM

## 2025-01-14 DIAGNOSIS — E11.649 TYPE 2 DIABETES MELLITUS WITH HYPOGLYCEMIA WITHOUT COMA, WITHOUT LONG-TERM CURRENT USE OF INSULIN (HCC): Primary | ICD-10-CM

## 2025-01-14 DIAGNOSIS — E78.2 MIXED HYPERLIPIDEMIA: ICD-10-CM

## 2025-01-14 DIAGNOSIS — H61.23 BILATERAL IMPACTED CERUMEN: ICD-10-CM

## 2025-01-14 DIAGNOSIS — I73.9 PERIPHERAL ARTERIAL DISEASE: ICD-10-CM

## 2025-01-14 DIAGNOSIS — I73.9 PERIPHERAL ARTERIAL DISEASE (HCC): ICD-10-CM

## 2025-01-14 LAB
ALBUMIN SERPL-MCNC: 4.8 G/DL (ref 3.5–5.2)
ALBUMIN/GLOB SERPL: 1.9 {RATIO} (ref 1–2.5)
ALP SERPL-CCNC: 67 U/L (ref 40–129)
ALT SERPL-CCNC: 19 U/L (ref 10–50)
ANION GAP SERPL CALCULATED.3IONS-SCNC: 12 MMOL/L (ref 9–16)
AST SERPL-CCNC: 16 U/L (ref 10–50)
BASOPHILS # BLD: 0.05 K/UL (ref 0–0.2)
BASOPHILS NFR BLD: 1 % (ref 0–2)
BILIRUB SERPL-MCNC: 0.4 MG/DL (ref 0–1.2)
BUN SERPL-MCNC: 10 MG/DL (ref 8–23)
CALCIUM SERPL-MCNC: 10 MG/DL (ref 8.6–10.4)
CHLORIDE SERPL-SCNC: 96 MMOL/L (ref 98–107)
CHOLEST SERPL-MCNC: 158 MG/DL (ref 0–199)
CHOLESTEROL/HDL RATIO: 2.9
CO2 SERPL-SCNC: 25 MMOL/L (ref 20–31)
CREAT SERPL-MCNC: 0.6 MG/DL (ref 0.7–1.2)
CREAT UR-MCNC: 20.1 MG/DL (ref 39–259)
EOSINOPHIL # BLD: 0.07 K/UL (ref 0–0.44)
EOSINOPHILS RELATIVE PERCENT: 1 % (ref 1–4)
ERYTHROCYTE [DISTWIDTH] IN BLOOD BY AUTOMATED COUNT: 11.9 % (ref 11.8–14.4)
GFR, ESTIMATED: >90 ML/MIN/1.73M2
GLUCOSE SERPL-MCNC: 112 MG/DL (ref 74–99)
HBA1C MFR BLD: 6.4 %
HCT VFR BLD AUTO: 40.7 % (ref 40.7–50.3)
HCV AB SERPL QL IA: NONREACTIVE
HDLC SERPL-MCNC: 55 MG/DL
HGB BLD-MCNC: 14.2 G/DL (ref 13–17)
IMM GRANULOCYTES # BLD AUTO: <0.03 K/UL (ref 0–0.3)
IMM GRANULOCYTES NFR BLD: 0 %
LDLC SERPL CALC-MCNC: 82 MG/DL (ref 0–100)
LYMPHOCYTES NFR BLD: 1.04 K/UL (ref 1.1–3.7)
LYMPHOCYTES RELATIVE PERCENT: 14 % (ref 24–43)
MCH RBC QN AUTO: 31.3 PG (ref 25.2–33.5)
MCHC RBC AUTO-ENTMCNC: 34.9 G/DL (ref 28.4–34.8)
MCV RBC AUTO: 89.6 FL (ref 82.6–102.9)
MICROALBUMIN UR-MCNC: <12 MG/L (ref 0–20)
MICROALBUMIN/CREAT UR-RTO: ABNORMAL MCG/MG CREAT (ref 0–17)
MONOCYTES NFR BLD: 0.55 K/UL (ref 0.1–1.2)
MONOCYTES NFR BLD: 7 % (ref 3–12)
NEUTROPHILS NFR BLD: 77 % (ref 36–65)
NEUTS SEG NFR BLD: 5.87 K/UL (ref 1.5–8.1)
NRBC BLD-RTO: 0 PER 100 WBC
PLATELET # BLD AUTO: 263 K/UL (ref 138–453)
PMV BLD AUTO: 8.5 FL (ref 8.1–13.5)
POTASSIUM SERPL-SCNC: 4.8 MMOL/L (ref 3.7–5.3)
PROT SERPL-MCNC: 7.3 G/DL (ref 6.6–8.7)
RBC # BLD AUTO: 4.54 M/UL (ref 4.21–5.77)
SODIUM SERPL-SCNC: 133 MMOL/L (ref 136–145)
TRIGL SERPL-MCNC: 106 MG/DL
VLDLC SERPL CALC-MCNC: 21 MG/DL (ref 1–30)
WBC OTHER # BLD: 7.6 K/UL (ref 3.5–11.3)

## 2025-01-14 PROCEDURE — 3078F DIAST BP <80 MM HG: CPT | Performed by: STUDENT IN AN ORGANIZED HEALTH CARE EDUCATION/TRAINING PROGRAM

## 2025-01-14 PROCEDURE — 3044F HG A1C LEVEL LT 7.0%: CPT | Performed by: STUDENT IN AN ORGANIZED HEALTH CARE EDUCATION/TRAINING PROGRAM

## 2025-01-14 PROCEDURE — 99214 OFFICE O/P EST MOD 30 MIN: CPT | Performed by: STUDENT IN AN ORGANIZED HEALTH CARE EDUCATION/TRAINING PROGRAM

## 2025-01-14 PROCEDURE — 83036 HEMOGLOBIN GLYCOSYLATED A1C: CPT | Performed by: STUDENT IN AN ORGANIZED HEALTH CARE EDUCATION/TRAINING PROGRAM

## 2025-01-14 PROCEDURE — 3074F SYST BP LT 130 MM HG: CPT | Performed by: STUDENT IN AN ORGANIZED HEALTH CARE EDUCATION/TRAINING PROGRAM

## 2025-01-14 RX ORDER — BENZTROPINE MESYLATE 0.5 MG/1
0.5 TABLET ORAL 2 TIMES DAILY
COMMUNITY
Start: 2024-12-20

## 2025-01-14 RX ORDER — CICLOPIROX OLAMINE 7.7 MG/G
CREAM TOPICAL
Status: CANCELLED | OUTPATIENT
Start: 2025-01-14

## 2025-01-14 RX ORDER — KETOCONAZOLE 20 MG/ML
SHAMPOO, SUSPENSION TOPICAL
Qty: 120 ML | Refills: 2 | Status: SHIPPED | OUTPATIENT
Start: 2025-01-14

## 2025-01-14 RX ORDER — OLANZAPINE 7.5 MG/1
7.5 TABLET, FILM COATED ORAL NIGHTLY
Qty: 30 TABLET | Refills: 0 | Status: CANCELLED | OUTPATIENT
Start: 2025-01-14

## 2025-01-14 RX ORDER — OLANZAPINE 5 MG/1
5 TABLET ORAL NIGHTLY
COMMUNITY
Start: 2024-12-19

## 2025-01-14 SDOH — ECONOMIC STABILITY: FOOD INSECURITY: WITHIN THE PAST 12 MONTHS, YOU WORRIED THAT YOUR FOOD WOULD RUN OUT BEFORE YOU GOT MONEY TO BUY MORE.: NEVER TRUE

## 2025-01-14 SDOH — ECONOMIC STABILITY: FOOD INSECURITY: WITHIN THE PAST 12 MONTHS, THE FOOD YOU BOUGHT JUST DIDN'T LAST AND YOU DIDN'T HAVE MONEY TO GET MORE.: NEVER TRUE

## 2025-01-14 ASSESSMENT — PATIENT HEALTH QUESTIONNAIRE - PHQ9
6. FEELING BAD ABOUT YOURSELF - OR THAT YOU ARE A FAILURE OR HAVE LET YOURSELF OR YOUR FAMILY DOWN: NEARLY EVERY DAY
9. THOUGHTS THAT YOU WOULD BE BETTER OFF DEAD, OR OF HURTING YOURSELF: SEVERAL DAYS
SUM OF ALL RESPONSES TO PHQ QUESTIONS 1-9: 21
SUM OF ALL RESPONSES TO PHQ QUESTIONS 1-9: 20
SUM OF ALL RESPONSES TO PHQ QUESTIONS 1-9: 21
5. POOR APPETITE OR OVEREATING: NEARLY EVERY DAY
1. LITTLE INTEREST OR PLEASURE IN DOING THINGS: NEARLY EVERY DAY
3. TROUBLE FALLING OR STAYING ASLEEP: NOT AT ALL
10. IF YOU CHECKED OFF ANY PROBLEMS, HOW DIFFICULT HAVE THESE PROBLEMS MADE IT FOR YOU TO DO YOUR WORK, TAKE CARE OF THINGS AT HOME, OR GET ALONG WITH OTHER PEOPLE: NOT DIFFICULT AT ALL
SUM OF ALL RESPONSES TO PHQ QUESTIONS 1-9: 21
8. MOVING OR SPEAKING SO SLOWLY THAT OTHER PEOPLE COULD HAVE NOTICED. OR THE OPPOSITE, BEING SO FIGETY OR RESTLESS THAT YOU HAVE BEEN MOVING AROUND A LOT MORE THAN USUAL: NEARLY EVERY DAY
7. TROUBLE CONCENTRATING ON THINGS, SUCH AS READING THE NEWSPAPER OR WATCHING TELEVISION: MORE THAN HALF THE DAYS
2. FEELING DOWN, DEPRESSED OR HOPELESS: NEARLY EVERY DAY
4. FEELING TIRED OR HAVING LITTLE ENERGY: NEARLY EVERY DAY

## 2025-01-14 NOTE — ASSESSMENT & PLAN NOTE
Chronic, at goal (stable), His A1c levels are commendably at 6.4, indicating effective management of his diabetes. He has also achieved a weight loss of 11 pounds over the past 9 months. He is advised to continue his current dietary regimen and lifestyle modifications. Laboratory tests will be ordered to monitor his condition.    Orders:    POCT glycosylated hemoglobin (Hb A1C)    CBC with Auto Differential; Future    Comprehensive Metabolic Panel; Future    Lipid Panel; Future    Albumin/Creatinine Ratio, Urine; Future

## 2025-01-14 NOTE — ASSESSMENT & PLAN NOTE
Chronic, not at goal (unstable), changes made today:      Orders:    ketoconazole (NIZORAL) 2 % shampoo; apply 5 to 10 MILLILITERS to scalp and LATHER AND LEAVE ON FOR 3 TO 5 MINUTES two times a week for 4 weeks

## 2025-01-14 NOTE — ASSESSMENT & PLAN NOTE
Chronic, at goal (stable), continue current treatment plan    Orders:    diclofenac sodium (VOLTAREN) 1 % GEL; Apply 4 g topically 4 times daily

## 2025-01-14 NOTE — PROGRESS NOTES
stress disorder    Primary osteoarthritis involving multiple joints    Benign prostatic hyperplasia with weak urinary stream    Essential tremor    Restless leg syndrome    Seasonal allergic rhinitis due to pollen    Asthma    Chronic obstructive pulmonary disease (HCC)    Lower extremity edema    Onychomycosis    Memory change    Obstructive sleep apnea    Seborrheic dermatitis    Nausea    Atherosclerosis    Status post cataract extraction and insertion of intraocular lens, right    Swift's esophagus without dysplasia    Hypoglycemia    Eczema       Past Medical History:   Diagnosis Date    Alcohol abuse 05/21/2024    Alcohol dependence ([facility]) 05/04/2012    Arterial vascular disease     Asthma     Bipolar disorder ([facility])     Cataract, right     Chronic respiratory failure with hypoxia ([facility]) 06/16/2023    COPD (chronic obstructive pulmonary disease) ([facility])     Diabetes mellitus ([facility])     Enlarged prostate     GERD (gastroesophageal reflux disease)     Hyperlipidemia     Hypertension        Past Surgical History:   Procedure Laterality Date    HERNIA REPAIR          Social History     Socioeconomic History    Marital status:      Spouse name: None    Number of children: None    Years of education: None    Highest education level: None   Tobacco Use    Smoking status: Former     Current packs/day: 0.00     Average packs/day: 4.0 packs/day for 54.0 years (216.0 ttl pk-yrs)     Types: Cigarettes     Start date: 1968     Quit date: 2022     Years since quitting: 3.1    Smokeless tobacco: Never   Vaping Use    Vaping status: Never Used   Substance and Sexual Activity    Alcohol use: Not Currently     Comment: sober for 1 year    Drug use: Never     Social Drivers of Health     Financial Resource Strain: Low Risk  (11/6/2023)    Overall Financial Resource Strain (CARDIA)     Difficulty of Paying Living Expenses: Not hard at all   Food Insecurity: No Food Insecurity (1/14/2025)    Hunger Vital Sign     Worried About

## 2025-01-16 ENCOUNTER — TELEPHONE (OUTPATIENT)
Dept: PRIMARY CARE CLINIC | Age: 65
End: 2025-01-16

## 2025-01-16 NOTE — TELEPHONE ENCOUNTER
Pt had left paperwork after his LOV for trip reimbursement. He needs this filled out and signed by PCP then faxed. The number to call for the fax is [phone]. Pt was meant to sign the paper, but had not. He requested that I mail the form to him and he will return once it is signed.

## 2025-01-21 RX ORDER — CILOSTAZOL 50 MG/1
50 TABLET ORAL 2 TIMES DAILY
Qty: 180 TABLET | Refills: 1 | Status: SHIPPED | OUTPATIENT
Start: 2025-01-21

## 2025-01-22 ENCOUNTER — TELEPHONE (OUTPATIENT)
Dept: PRIMARY CARE CLINIC | Age: 65
End: 2025-01-22

## 2025-01-22 DIAGNOSIS — K59.00 CONSTIPATION, UNSPECIFIED CONSTIPATION TYPE: Primary | ICD-10-CM

## 2025-01-22 NOTE — TELEPHONE ENCOUNTER
Please call him.  He wants to know if you sent the paper work to him and he is wanting pills for his constipation.

## 2025-01-22 NOTE — TELEPHONE ENCOUNTER
Pt states that he is having loose bowel movements, but still feels like he is \"full\" of stool. He has been using stool softeners, but is requesting a suppository to help \"clean it out like when you have a colonoscopy\". I told him that I would send the message to Dr. Power and call him back once there was a response.

## 2025-01-23 NOTE — TELEPHONE ENCOUNTER
Pt states that he was able to have a larger bowel movement this morning and he feels like he \"cleared it\". He wants to continue taking the stool softener daily to help prevent this from happening again.

## 2025-01-23 NOTE — TELEPHONE ENCOUNTER
Would prefer to verify that he is actually constipated if passing liquid stool prior to given a stimulant laxative.  Order for abdominal xray placed.

## 2025-01-27 ENCOUNTER — TELEPHONE (OUTPATIENT)
Dept: PRIMARY CARE CLINIC | Age: 65
End: 2025-01-27

## 2025-01-27 NOTE — TELEPHONE ENCOUNTER
Last appt 01/14/25  Next appt 04/14/25    Pt would like a call back in regards to he is struggling to keep his sugar levels up.    Please advise.

## 2025-01-28 ENCOUNTER — TELEPHONE (OUTPATIENT)
Dept: PRIMARY CARE CLINIC | Age: 65
End: 2025-01-28

## 2025-01-28 NOTE — TELEPHONE ENCOUNTER
Patient called back and gave the specific days he was able to come in for an appt. Caller states he had 2 nights of his BGGL levels dropping but since then he has been able to stable his levels to the the \"green\" area and voiced no medical concern. Patient made his appt and no further action was taken by writer.

## 2025-01-28 NOTE — TELEPHONE ENCOUNTER
Please have patient schedule an appointment to discuss.  He is not taking any medication to lower his blood glucose.  If he is experiencing frequent hypoglycemia this may be a manifestation of liver dysfunction and merits a conversation.

## 2025-02-07 ENCOUNTER — OFFICE VISIT (OUTPATIENT)
Dept: PRIMARY CARE CLINIC | Age: 65
End: 2025-02-07

## 2025-02-07 VITALS
HEIGHT: 67 IN | OXYGEN SATURATION: 97 % | DIASTOLIC BLOOD PRESSURE: 66 MMHG | SYSTOLIC BLOOD PRESSURE: 104 MMHG | WEIGHT: 183 LBS | HEART RATE: 108 BPM | BODY MASS INDEX: 28.72 KG/M2

## 2025-02-07 DIAGNOSIS — L30.9 ECZEMA, UNSPECIFIED TYPE: ICD-10-CM

## 2025-02-07 DIAGNOSIS — E16.2 HYPOGLYCEMIA: Primary | ICD-10-CM

## 2025-02-07 DIAGNOSIS — I10 PRIMARY HYPERTENSION: ICD-10-CM

## 2025-02-07 DIAGNOSIS — L21.9 SEBORRHEIC DERMATITIS: ICD-10-CM

## 2025-02-07 DIAGNOSIS — I73.9 PERIPHERAL ARTERIAL DISEASE: ICD-10-CM

## 2025-02-07 DIAGNOSIS — E11.649 TYPE 2 DIABETES MELLITUS WITH HYPOGLYCEMIA WITHOUT COMA, WITHOUT LONG-TERM CURRENT USE OF INSULIN (HCC): ICD-10-CM

## 2025-02-07 DIAGNOSIS — K22.70 BARRETT'S ESOPHAGUS WITHOUT DYSPLASIA: ICD-10-CM

## 2025-02-07 RX ORDER — AMLODIPINE BESYLATE 5 MG/1
5 TABLET ORAL DAILY
Qty: 90 TABLET | Refills: 0 | Status: SHIPPED | OUTPATIENT
Start: 2025-02-07

## 2025-02-07 RX ORDER — TRIAMCINOLONE ACETONIDE 1 MG/G
CREAM TOPICAL 2 TIMES DAILY
Qty: 45 G | Refills: 2 | Status: SHIPPED | OUTPATIENT
Start: 2025-02-07 | End: 2025-02-21

## 2025-02-07 RX ORDER — CLOPIDOGREL BISULFATE 75 MG/1
75 TABLET ORAL DAILY
Qty: 90 TABLET | Refills: 3 | Status: SHIPPED | OUTPATIENT
Start: 2025-02-07

## 2025-02-07 RX ORDER — CLOTRIMAZOLE 1 %
CREAM (GRAM) TOPICAL
Qty: 60 G | Refills: 1 | Status: SHIPPED | OUTPATIENT
Start: 2025-02-07 | End: 2025-02-14

## 2025-02-07 RX ORDER — OMEPRAZOLE 40 MG/1
40 CAPSULE, DELAYED RELEASE ORAL 2 TIMES DAILY
Qty: 180 CAPSULE | Refills: 1 | Status: SHIPPED | OUTPATIENT
Start: 2025-02-07

## 2025-02-07 NOTE — ASSESSMENT & PLAN NOTE
Chronic, not at goal (unstable), The condition of his legs suggests nonspecific dermatitis, characterized by dryness. A prescription for triamcinolone cream was provided, to be applied twice daily for a maximum of one week at a time.  Patient to call with any worsening or persisting symptoms despite treatment.    Orders:    triamcinolone (KENALOG) 0.1 % cream; Apply topically 2 times daily for 14 days Apply topically 2 times daily.

## 2025-02-07 NOTE — PROGRESS NOTES
myself.    Electronically signed by Kameron Power DO on 2/7/2025 at 1:30 PM    The patient (or guardian, if applicable) and other individuals in attendance with the patient were advised that Artificial Intelligence will be utilized during this visit to record, process the conversation to generate a clinical note, and support improvement of the AI technology. The patient (or guardian, if applicable) and other individuals in attendance at the appointment consented to the use of AI, including the recording.

## 2025-02-07 NOTE — ASSESSMENT & PLAN NOTE
Chronic, at goal (stable), diet controlled, perhaps to a fault with regards to the hypoglycemia.

## 2025-02-07 NOTE — ASSESSMENT & PLAN NOTE
Chronic, at goal (stable), His scalp appears healthy.     Orders:    clotrimazole (LOTRIMIN) 1 % cream; Apply topically 2 times daily.

## 2025-02-07 NOTE — ASSESSMENT & PLAN NOTE
Chronic, not at goal (unstable), His liver enzymes are within normal limits, indicating no active inflammation. The synthetic function of his liver is also normal. The lowest recorded blood glucose level was 86, which is considered mild. It is possible that his hypoglycemia could be attributed to prolonged periods without food intake. The potential for an insulinoma was discussed, but this was deemed unlikely due to the absence of consistent low blood glucose levels. He was advised to maintain regular eating habits, even if it involves small snacks, to prevent extended periods without food.

## 2025-02-07 NOTE — ASSESSMENT & PLAN NOTE
Chronic, at goal (stable), continue current treatment plan    Orders:    omeprazole (PRILOSEC) 40 MG delayed release capsule; Take 1 capsule by mouth 2 times daily

## 2025-02-21 ENCOUNTER — TELEPHONE (OUTPATIENT)
Dept: PRIMARY CARE CLINIC | Age: 65
End: 2025-02-21

## 2025-02-21 NOTE — TELEPHONE ENCOUNTER
Pt is asking for a referral to ENT. He states that he is having trouble hearing from his RT ear and believes it is due to wax build-up; he was seen for this previously and stated that the wax was too hard to removed when he had an irrigation.     Pt would prefer someone in [place] as he is not familiar enough with [place] and does not want to drive far.     Please advise.

## 2025-02-21 NOTE — TELEPHONE ENCOUNTER
The closest ENT I am aware of is [place]. I am not familiar with [facility] specialists. If he has someone in particular, I am happy to send referral.

## 2025-02-27 ENCOUNTER — TELEPHONE (OUTPATIENT)
Dept: PRIMARY CARE CLINIC | Age: 65
End: 2025-02-27

## 2025-02-27 NOTE — TELEPHONE ENCOUNTER
Patient states he needs a letter consisting of his current health issues and does and don'ts he is restricted to since he is trying to get a new job. Patient would like it sent in the mail when finished.

## 2025-03-02 NOTE — TELEPHONE ENCOUNTER
Please have patient schedule an appointment to discuss exactly what needs to be in this letter as this is too broad a request as stated above.  I want to make sure that the letter contains exactly the information required while being respectful of patient's privacy and is as specific as necessary.  Thanks.

## 2025-03-04 ENCOUNTER — OFFICE VISIT (OUTPATIENT)
Dept: PRIMARY CARE CLINIC | Age: 65
End: 2025-03-04
Payer: MEDICARE

## 2025-03-04 VITALS
DIASTOLIC BLOOD PRESSURE: 74 MMHG | OXYGEN SATURATION: 98 % | HEART RATE: 78 BPM | BODY MASS INDEX: 28.25 KG/M2 | SYSTOLIC BLOOD PRESSURE: 116 MMHG | WEIGHT: 180 LBS | HEIGHT: 67 IN

## 2025-03-04 DIAGNOSIS — K22.70 BARRETT'S ESOPHAGUS WITHOUT DYSPLASIA: ICD-10-CM

## 2025-03-04 DIAGNOSIS — F31.9 BIPOLAR 1 DISORDER (HCC): ICD-10-CM

## 2025-03-04 DIAGNOSIS — K59.04 CHRONIC IDIOPATHIC CONSTIPATION: ICD-10-CM

## 2025-03-04 DIAGNOSIS — I73.9 PERIPHERAL ARTERIAL DISEASE: ICD-10-CM

## 2025-03-04 DIAGNOSIS — E11.649 TYPE 2 DIABETES MELLITUS WITH HYPOGLYCEMIA WITHOUT COMA, WITHOUT LONG-TERM CURRENT USE OF INSULIN (HCC): ICD-10-CM

## 2025-03-04 DIAGNOSIS — J43.9 PULMONARY EMPHYSEMA, UNSPECIFIED EMPHYSEMA TYPE (HCC): ICD-10-CM

## 2025-03-04 DIAGNOSIS — J45.40 MODERATE PERSISTENT ASTHMA WITHOUT COMPLICATION: ICD-10-CM

## 2025-03-04 DIAGNOSIS — Z02.1 PHYSICAL EXAM, PRE-EMPLOYMENT: Primary | ICD-10-CM

## 2025-03-04 DIAGNOSIS — R11.0 NAUSEA: ICD-10-CM

## 2025-03-04 DIAGNOSIS — K21.9 GASTROESOPHAGEAL REFLUX DISEASE WITHOUT ESOPHAGITIS: ICD-10-CM

## 2025-03-04 PROBLEM — F17.211 CIGARETTE NICOTINE DEPENDENCE IN REMISSION: Status: ACTIVE | Noted: 2023-06-15

## 2025-03-04 PROCEDURE — 99214 OFFICE O/P EST MOD 30 MIN: CPT | Performed by: STUDENT IN AN ORGANIZED HEALTH CARE EDUCATION/TRAINING PROGRAM

## 2025-03-04 PROCEDURE — 3074F SYST BP LT 130 MM HG: CPT | Performed by: STUDENT IN AN ORGANIZED HEALTH CARE EDUCATION/TRAINING PROGRAM

## 2025-03-04 PROCEDURE — 3078F DIAST BP <80 MM HG: CPT | Performed by: STUDENT IN AN ORGANIZED HEALTH CARE EDUCATION/TRAINING PROGRAM

## 2025-03-04 PROCEDURE — 3044F HG A1C LEVEL LT 7.0%: CPT | Performed by: STUDENT IN AN ORGANIZED HEALTH CARE EDUCATION/TRAINING PROGRAM

## 2025-03-04 RX ORDER — BISACODYL 10 MG
10 SUPPOSITORY, RECTAL RECTAL DAILY PRN
Qty: 30 SUPPOSITORY | Refills: 1 | Status: SHIPPED | OUTPATIENT
Start: 2025-03-04

## 2025-03-04 RX ORDER — DOCUSATE SODIUM 100 MG/1
100 CAPSULE, LIQUID FILLED ORAL 2 TIMES DAILY
Qty: 180 CAPSULE | Refills: 3 | Status: SHIPPED | OUTPATIENT
Start: 2025-03-04

## 2025-03-04 RX ORDER — POLYETHYLENE GLYCOL 3350 17 G/17G
17 POWDER, FOR SOLUTION ORAL DAILY
Qty: 510 G | Refills: 3 | Status: SHIPPED | OUTPATIENT
Start: 2025-03-04

## 2025-03-04 NOTE — PROGRESS NOTES
emphysema, unspecified emphysema type (HCC)   Chronic, at goal (stable), His COPD is currently well-managed, but there is potential for exacerbation with prolonged physical activity. A referral to pulmonary rehabilitation will be made to enhance his endurance. A repeat pulmonary function test will be ordered to obtain an updated assessment. He is advised to use his over-the-shoulder concentrator at work if needed.    Orders:    Full PFT Study With Bronchodilator; Future    External Referral To Pulmonary Rehab    Moderate persistent asthma without complication   Chronic, at goal (stable), continue current treatment plan         Peripheral arterial disease   Chronic, at goal (stable), continue Plavix, cilostazol, simvastatin as prescribed.  Aggressive risk factor modification.  Patient to follow up with vascular surgery as scheduled.  Discussed that regular walking program could help endurance and encourage neovascularization.           Bipolar 1 disorder ([facility])   Monitored by specialist- no acute findings meriting change in the plan         Gastroesophageal reflux disease without esophagitis   Chronic, at goal (stable), continue current treatment plan    [facility]    Swift's esophagus without dysplasia   Chronic, at goal (stable), changes made today:      Orders:    [facility], [place]    Chronic idiopathic constipation   Chronic, not at goal (unstable), He reports difficulty with bowel movements. He is currently taking over-the-counter laxatives and stool softeners. Prescriptions for docusate and MiraLAX will be provided. He is advised to use suppositories only if he has not had a bowel movement in 3 days.    Orders:    polyethylene glycol (GLYCOLAX) 17 GM/SCOOP powder; Take 17 g by mouth daily    docusate sodium (COLACE) 100 MG capsule; Take 1 capsule by mouth 2 times daily    bisacodyl (DULCOLAX) 10 MG suppository; Place 1 suppository rectally daily as needed for

## 2025-03-04 NOTE — ASSESSMENT & PLAN NOTE
Chronic, at goal (stable), continue current treatment plan    Orders:    [facility]

## 2025-03-04 NOTE — ASSESSMENT & PLAN NOTE
Chronic, not at goal (unstable), He reports persistent nausea, which may be related to constipation or other gastrointestinal issues. A referral to a gastroenterologist will be made for further evaluation and consideration of repeating an upper scope. He is advised to ensure regular bowel movements to alleviate nausea.    Orders:    [facility]

## 2025-03-04 NOTE — ASSESSMENT & PLAN NOTE
Chronic, at goal (stable), His COPD is currently well-managed, but there is potential for exacerbation with prolonged physical activity. A referral to pulmonary rehabilitation will be made to enhance his endurance. A repeat pulmonary function test will be ordered to obtain an updated assessment. He is advised to use his over-the-shoulder concentrator at work if needed.    Orders:    Full PFT Study With Bronchodilator; Future    External Referral To Pulmonary Rehab

## 2025-03-04 NOTE — ASSESSMENT & PLAN NOTE
Chronic, at goal (stable), continue Plavix, cilostazol, simvastatin as prescribed.  Aggressive risk factor modification.  Patient to follow up with vascular surgery as scheduled.  Discussed that regular walking program could help endurance and encourage neovascularization.

## 2025-03-19 RX ORDER — PRAZOSIN HYDROCHLORIDE 1 MG/1
1 CAPSULE ORAL NIGHTLY
COMMUNITY
Start: 2025-03-13

## 2025-03-19 RX ORDER — FLUTICASONE PROPIONATE 50 MCG
2 SPRAY, SUSPENSION (ML) NASAL DAILY
Qty: 16 G | Refills: 2 | Status: SHIPPED | OUTPATIENT
Start: 2025-03-19

## 2025-03-19 RX ORDER — ARIPIPRAZOLE 5 MG/1
5 TABLET ORAL DAILY
COMMUNITY
Start: 2025-03-13

## 2025-03-27 DIAGNOSIS — G25.0 ESSENTIAL TREMOR: ICD-10-CM

## 2025-03-27 NOTE — TELEPHONE ENCOUNTER
Patient states he needs his gabapentin upped up to 300mg instead due to him stating he is having trouble sleeping

## 2025-03-29 RX ORDER — GABAPENTIN 300 MG/1
300 CAPSULE ORAL 3 TIMES DAILY
Qty: 90 CAPSULE | Refills: 2 | Status: SHIPPED | OUTPATIENT
Start: 2025-03-29 | End: 2025-06-27

## 2025-04-12 ASSESSMENT — ENCOUNTER SYMPTOMS
BLOOD IN STOOL: 0
WHEEZING: 0
COUGH: 0
CONSTIPATION: 0
ABDOMINAL PAIN: 0
NAUSEA: 0
DIARRHEA: 0
VOMITING: 0
SHORTNESS OF BREATH: 0

## 2025-04-28 ENCOUNTER — OFFICE VISIT (OUTPATIENT)
Dept: PRIMARY CARE CLINIC | Age: 65
End: 2025-04-28
Payer: MEDICARE

## 2025-04-28 VITALS
SYSTOLIC BLOOD PRESSURE: 102 MMHG | OXYGEN SATURATION: 96 % | DIASTOLIC BLOOD PRESSURE: 72 MMHG | WEIGHT: 185 LBS | HEART RATE: 91 BPM | BODY MASS INDEX: 29.03 KG/M2 | HEIGHT: 67 IN

## 2025-04-28 DIAGNOSIS — Z87.891 PERSONAL HISTORY OF TOBACCO USE: ICD-10-CM

## 2025-04-28 DIAGNOSIS — Z00.00 INITIAL MEDICARE ANNUAL WELLNESS VISIT: Primary | ICD-10-CM

## 2025-04-28 DIAGNOSIS — E11.9 TYPE 2 DIABETES MELLITUS WITHOUT RETINOPATHY (HCC): ICD-10-CM

## 2025-04-28 LAB — HBA1C MFR BLD: 6 %

## 2025-04-28 PROCEDURE — 3078F DIAST BP <80 MM HG: CPT | Performed by: STUDENT IN AN ORGANIZED HEALTH CARE EDUCATION/TRAINING PROGRAM

## 2025-04-28 PROCEDURE — 3044F HG A1C LEVEL LT 7.0%: CPT | Performed by: STUDENT IN AN ORGANIZED HEALTH CARE EDUCATION/TRAINING PROGRAM

## 2025-04-28 PROCEDURE — G0438 PPPS, INITIAL VISIT: HCPCS | Performed by: STUDENT IN AN ORGANIZED HEALTH CARE EDUCATION/TRAINING PROGRAM

## 2025-04-28 PROCEDURE — G0296 VISIT TO DETERM LDCT ELIG: HCPCS | Performed by: STUDENT IN AN ORGANIZED HEALTH CARE EDUCATION/TRAINING PROGRAM

## 2025-04-28 PROCEDURE — 3074F SYST BP LT 130 MM HG: CPT | Performed by: STUDENT IN AN ORGANIZED HEALTH CARE EDUCATION/TRAINING PROGRAM

## 2025-04-28 PROCEDURE — 83036 HEMOGLOBIN GLYCOSYLATED A1C: CPT | Performed by: STUDENT IN AN ORGANIZED HEALTH CARE EDUCATION/TRAINING PROGRAM

## 2025-04-28 ASSESSMENT — PATIENT HEALTH QUESTIONNAIRE - PHQ9
SUM OF ALL RESPONSES TO PHQ QUESTIONS 1-9: 12
4. FEELING TIRED OR HAVING LITTLE ENERGY: MORE THAN HALF THE DAYS
9. THOUGHTS THAT YOU WOULD BE BETTER OFF DEAD, OR OF HURTING YOURSELF: SEVERAL DAYS
1. LITTLE INTEREST OR PLEASURE IN DOING THINGS: MORE THAN HALF THE DAYS
SUM OF ALL RESPONSES TO PHQ QUESTIONS 1-9: 11
2. FEELING DOWN, DEPRESSED OR HOPELESS: NOT AT ALL
SUM OF ALL RESPONSES TO PHQ QUESTIONS 1-9: 12
8. MOVING OR SPEAKING SO SLOWLY THAT OTHER PEOPLE COULD HAVE NOTICED. OR THE OPPOSITE, BEING SO FIGETY OR RESTLESS THAT YOU HAVE BEEN MOVING AROUND A LOT MORE THAN USUAL: SEVERAL DAYS
7. TROUBLE CONCENTRATING ON THINGS, SUCH AS READING THE NEWSPAPER OR WATCHING TELEVISION: SEVERAL DAYS
SUM OF ALL RESPONSES TO PHQ QUESTIONS 1-9: 12
10. IF YOU CHECKED OFF ANY PROBLEMS, HOW DIFFICULT HAVE THESE PROBLEMS MADE IT FOR YOU TO DO YOUR WORK, TAKE CARE OF THINGS AT HOME, OR GET ALONG WITH OTHER PEOPLE: SOMEWHAT DIFFICULT
5. POOR APPETITE OR OVEREATING: SEVERAL DAYS
6. FEELING BAD ABOUT YOURSELF - OR THAT YOU ARE A FAILURE OR HAVE LET YOURSELF OR YOUR FAMILY DOWN: SEVERAL DAYS
3. TROUBLE FALLING OR STAYING ASLEEP: NEARLY EVERY DAY

## 2025-04-28 ASSESSMENT — COLUMBIA-SUICIDE SEVERITY RATING SCALE - C-SSRS
2. HAVE YOU ACTUALLY HAD ANY THOUGHTS OF KILLING YOURSELF?: NO
6. HAVE YOU EVER DONE ANYTHING, STARTED TO DO ANYTHING, OR PREPARED TO DO ANYTHING TO END YOUR LIFE?: NO
1. WITHIN THE PAST MONTH, HAVE YOU WISHED YOU WERE DEAD OR WISHED YOU COULD GO TO SLEEP AND NOT WAKE UP?: NO

## 2025-04-28 ASSESSMENT — LIFESTYLE VARIABLES
HOW MANY STANDARD DRINKS CONTAINING ALCOHOL DO YOU HAVE ON A TYPICAL DAY: PATIENT DOES NOT DRINK
HOW OFTEN DO YOU HAVE A DRINK CONTAINING ALCOHOL: NEVER

## 2025-04-28 NOTE — PROGRESS NOTES
Medicare Annual Wellness Visit    Jai Walters is here for Medicare AWV and Diabetes (Pt is not on medication at this time. He does check his BG at home multiple times. He gets readings around 100-130. No concerns. Pt's last hgb A1c was 6.4 on 1/14/25. )    Assessment & Plan  1. Constipation.  - Ongoing issues with bowel movements despite taking laxatives, stool softeners, and suppositories.  - Physical exam findings indicate no immediate concerns; current regimen includes prescribed medications.  - Discussion includes monitoring symptoms and considering further evaluation if no improvement.  - Continue current regimen and monitor symptoms; further evaluation may be necessary if issues persist.    2. Weight gain.  - Reports gaining 5 pounds, which is concerning to him.  - Physical exam findings indicate weight fluctuation; advised to monitor diet.  - Discussion includes dietary habits, particularly intake of chocolate and peanut butter.  - Monitor diet and continue efforts to maintain a healthy weight.    3. Diabetes Mellitus.  - A1c has improved to 6.0 from 6.4.  - Blood sugar levels are stable with no recent hypoglycemic episodes.  - Discussion includes maintaining current diabetes management plan and balanced diet.  - Continue current diabetes management plan, including monitoring blood sugar levels and maintaining a balanced diet.    4. Emphysema.  - Physical exam findings include no wheezing; advised to use Tessalon Perles for cough.  - Discussion includes ordering a chest CT scan for lung cancer screening, as it has been over a year since the last scan.  - Continue using Tessalon Perles for cough for about a week; report any worsening symptoms such as increased wheezing or consistent shortness of breath. Order chest CT scan for lung cancer screening.    5. Depression.  - Reports feeling down and hopeless, with significant stress contributing to symptoms.  - No immediate physical exam findings related to

## 2025-04-28 NOTE — PATIENT INSTRUCTIONS
device in the bathroom with you.   Where can you learn more?  Go to https://[email or web address].net/patientEd and enter G117 to learn more about \"Preventing Falls: Care Instructions.\"  Current as of: July 31, 2024  Content Version: 14.4  © 5803-9187 [employer].   Care instructions adapted under license by [employer]. If you have questions about a medical condition or this instruction, always ask your healthcare professional. [employer] disclaims any warranty or liability for your use of this information.         Learning About Mindfulness for Stress  What are mindfulness and stress?     Stress is your body's response to a hard situation. Your body can have a physical, emotional, or mental response. A lot of things can cause stress. You may feel stress when you go on a job interview, take a test, or run a race. This kind of short-term stress is normal and even useful. It can help you if you need to work hard or react quickly.  Stress also can last a long time. Long-term stress is caused by stressful situations or events. Examples of long-term stress include long-term health problems, ongoing problems at work, and conflicts in your family. Long-term stress can harm your health.  Mindfulness is a focus only on things happening in the present moment. It's a process of purposefully paying attention to and being aware of your surroundings, your emotions, your thoughts, and how your body feels. You are aware of these things, but you aren't judging these experiences as \"good\" or \"bad.\" Mindfulness can help you learn to calm your mind and body to help you cope with illness, pain, and stress.  How does mindfulness help to relieve stress?  Mindfulness can help quiet your mind and relax your body. Studies show that it can help some people sleep better, feel less anxious, and bring their blood pressure down. And it's been shown to help some people live and cope better with certain health problems like heart

## 2025-05-05 DIAGNOSIS — J96.11 CHRONIC RESPIRATORY FAILURE WITH HYPOXIA (HCC): ICD-10-CM

## 2025-05-05 DIAGNOSIS — J43.9 PULMONARY EMPHYSEMA, UNSPECIFIED EMPHYSEMA TYPE (HCC): ICD-10-CM

## 2025-05-06 RX ORDER — ARIPIPRAZOLE 10 MG/1
10 TABLET ORAL DAILY
COMMUNITY
Start: 2025-04-30

## 2025-05-06 RX ORDER — FLUTICASONE FUROATE, UMECLIDINIUM BROMIDE AND VILANTEROL TRIFENATATE 200; 62.5; 25 UG/1; UG/1; UG/1
1 POWDER RESPIRATORY (INHALATION) DAILY
Qty: 60 EACH | Refills: 5 | Status: SHIPPED | OUTPATIENT
Start: 2025-05-06

## 2025-05-06 RX ORDER — BUSPIRONE HYDROCHLORIDE 10 MG/1
10 TABLET ORAL 2 TIMES DAILY
COMMUNITY
Start: 2025-04-30

## 2025-05-06 NOTE — TELEPHONE ENCOUNTER
Jai Walters is requesting a refill on the following medication(s):  Requested Prescriptions     Pending Prescriptions Disp Refills    TRELEGY ELLIPTA 200-62.5-25 MCG/ACT AEPB inhaler [Pharmacy Med Name: Trelegy Ellipta 200 mcg-62.5 mcg-25 mcg powder for inhalation]  1     Sig: INHALE 1 puff BY MOUTH ONCE DAILY       Last Visit Date (If Applicable):  4/28/2025    Next Visit Date:    7/28/2025  
Fall,

## 2025-05-08 DIAGNOSIS — I10 PRIMARY HYPERTENSION: ICD-10-CM

## 2025-05-08 RX ORDER — AMLODIPINE BESYLATE 5 MG/1
5 TABLET ORAL DAILY
Qty: 90 TABLET | Refills: 3 | Status: SHIPPED | OUTPATIENT
Start: 2025-05-08

## 2025-05-21 NOTE — TELEPHONE ENCOUNTER
LAST VISIT:   4/28/2025     Future Appointments   Date Time Provider Department Center   7/28/2025 10:00 AM Kameron Power DO [place] [facility] DEP

## 2025-05-22 DIAGNOSIS — J43.9 PULMONARY EMPHYSEMA, UNSPECIFIED EMPHYSEMA TYPE (HCC): ICD-10-CM

## 2025-05-23 RX ORDER — BENZONATATE 100 MG/1
CAPSULE ORAL
Qty: 30 CAPSULE | Refills: 2 | Status: SHIPPED | OUTPATIENT
Start: 2025-05-23

## 2025-06-23 RX ORDER — LISINOPRIL 40 MG/1
40 TABLET ORAL DAILY
Qty: 90 TABLET | Refills: 3 | Status: SHIPPED | OUTPATIENT
Start: 2025-06-23

## 2025-06-23 NOTE — TELEPHONE ENCOUNTER
LAST VISIT:   4/28/2025     Future Appointments   Date Time Provider Department Center   7/28/2025 10:00 AM Kameron Power DO [place] [facility] DEP

## 2025-06-27 ENCOUNTER — TELEPHONE (OUTPATIENT)
Dept: PRIMARY CARE CLINIC | Age: 65
End: 2025-06-27

## 2025-07-02 NOTE — TELEPHONE ENCOUNTER
Last appt 04/28/25  Next appt 07/28/25      Pt called stating needs referral through [facility] for his oxygen.    He also is asking for an alternative supplier for oxygen.    Please call pt.  
Please call patient and help him identify an in-network DME provider for his oxygen.  I just sent an order for oxygen earlier today to what I believe is his current supplier; would recommend having order filled to allow time to transition to a new company.  
Pt called again in regards to Oxygen supplier.  He has concerns about not having it soon.    Please contact pt.  
TEEMOM advising pt to call insurance to find in-network DME company for oxygen then to call us with the information so we can get a new order sent over.   
Dyspnea on exertion

## 2025-07-08 ENCOUNTER — TELEPHONE (OUTPATIENT)
Dept: PRIMARY CARE CLINIC | Age: 65
End: 2025-07-08

## 2025-07-08 NOTE — TELEPHONE ENCOUNTER
Pt will need a new order for his oxygen and oxygen supplies. He needed to switch companies and they are are requiring a new order. Will fax once this is complete.     Please advise.

## 2025-07-22 ENCOUNTER — TELEPHONE (OUTPATIENT)
Dept: PRIMARY CARE CLINIC | Age: 65
End: 2025-07-22

## 2025-07-22 NOTE — TELEPHONE ENCOUNTER
Called pt to confirm that oxygen was dropped off to his home. It was being delivered while I was on the phone with him.

## 2025-07-28 ENCOUNTER — OFFICE VISIT (OUTPATIENT)
Dept: PRIMARY CARE CLINIC | Age: 65
End: 2025-07-28
Payer: MEDICARE

## 2025-07-28 VITALS
WEIGHT: 194 LBS | HEIGHT: 67 IN | SYSTOLIC BLOOD PRESSURE: 120 MMHG | BODY MASS INDEX: 30.45 KG/M2 | OXYGEN SATURATION: 98 % | HEART RATE: 71 BPM | DIASTOLIC BLOOD PRESSURE: 68 MMHG

## 2025-07-28 DIAGNOSIS — E11.9 TYPE 2 DIABETES MELLITUS WITHOUT RETINOPATHY (HCC): ICD-10-CM

## 2025-07-28 DIAGNOSIS — E11.51 TYPE 2 DIABETES MELLITUS WITH DIABETIC PERIPHERAL ANGIOPATHY WITHOUT GANGRENE, WITHOUT LONG-TERM CURRENT USE OF INSULIN (HCC): ICD-10-CM

## 2025-07-28 DIAGNOSIS — J45.41 MODERATE PERSISTENT ASTHMA WITH ACUTE EXACERBATION: Primary | ICD-10-CM

## 2025-07-28 DIAGNOSIS — J96.11 CHRONIC RESPIRATORY FAILURE WITH HYPOXIA (HCC): ICD-10-CM

## 2025-07-28 DIAGNOSIS — J43.9 PULMONARY EMPHYSEMA, UNSPECIFIED EMPHYSEMA TYPE (HCC): ICD-10-CM

## 2025-07-28 PROBLEM — E11.649 TYPE 2 DIABETES MELLITUS WITH HYPOGLYCEMIA WITHOUT COMA, WITHOUT LONG-TERM CURRENT USE OF INSULIN (HCC): Status: RESOLVED | Noted: 2023-01-11 | Resolved: 2025-07-28

## 2025-07-28 PROBLEM — E16.2 HYPOGLYCEMIA: Status: RESOLVED | Noted: 2025-02-07 | Resolved: 2025-07-28

## 2025-07-28 PROCEDURE — 83036 HEMOGLOBIN GLYCOSYLATED A1C: CPT | Performed by: STUDENT IN AN ORGANIZED HEALTH CARE EDUCATION/TRAINING PROGRAM

## 2025-07-28 PROCEDURE — 3078F DIAST BP <80 MM HG: CPT | Performed by: STUDENT IN AN ORGANIZED HEALTH CARE EDUCATION/TRAINING PROGRAM

## 2025-07-28 PROCEDURE — 3074F SYST BP LT 130 MM HG: CPT | Performed by: STUDENT IN AN ORGANIZED HEALTH CARE EDUCATION/TRAINING PROGRAM

## 2025-07-28 PROCEDURE — 1036F TOBACCO NON-USER: CPT | Performed by: STUDENT IN AN ORGANIZED HEALTH CARE EDUCATION/TRAINING PROGRAM

## 2025-07-28 PROCEDURE — G8417 CALC BMI ABV UP PARAM F/U: HCPCS | Performed by: STUDENT IN AN ORGANIZED HEALTH CARE EDUCATION/TRAINING PROGRAM

## 2025-07-28 PROCEDURE — 3017F COLORECTAL CA SCREEN DOC REV: CPT | Performed by: STUDENT IN AN ORGANIZED HEALTH CARE EDUCATION/TRAINING PROGRAM

## 2025-07-28 PROCEDURE — G8427 DOCREV CUR MEDS BY ELIG CLIN: HCPCS | Performed by: STUDENT IN AN ORGANIZED HEALTH CARE EDUCATION/TRAINING PROGRAM

## 2025-07-28 PROCEDURE — 3044F HG A1C LEVEL LT 7.0%: CPT | Performed by: STUDENT IN AN ORGANIZED HEALTH CARE EDUCATION/TRAINING PROGRAM

## 2025-07-28 PROCEDURE — 99214 OFFICE O/P EST MOD 30 MIN: CPT | Performed by: STUDENT IN AN ORGANIZED HEALTH CARE EDUCATION/TRAINING PROGRAM

## 2025-07-28 PROCEDURE — 2022F DILAT RTA XM EVC RTNOPTHY: CPT | Performed by: STUDENT IN AN ORGANIZED HEALTH CARE EDUCATION/TRAINING PROGRAM

## 2025-07-28 PROCEDURE — 3023F SPIROM DOC REV: CPT | Performed by: STUDENT IN AN ORGANIZED HEALTH CARE EDUCATION/TRAINING PROGRAM

## 2025-07-28 RX ORDER — ESCITALOPRAM OXALATE 5 MG/1
5 TABLET ORAL DAILY
COMMUNITY
Start: 2025-06-27

## 2025-07-28 RX ORDER — ARIPIPRAZOLE 15 MG/1
15 TABLET ORAL DAILY
COMMUNITY
Start: 2025-05-28

## 2025-07-28 RX ORDER — PREDNISONE 10 MG/1
TABLET ORAL
Qty: 21 TABLET | Refills: 0 | Status: SHIPPED | OUTPATIENT
Start: 2025-07-28 | End: 2025-08-06

## 2025-07-29 LAB — HBA1C MFR BLD: 5.9 %

## 2025-07-30 ENCOUNTER — TELEPHONE (OUTPATIENT)
Dept: PRIMARY CARE CLINIC | Age: 65
End: 2025-07-30

## 2025-07-30 NOTE — TELEPHONE ENCOUNTER
JASONI:      To Dr Castillo from [facility]    \"THANK YOU FROM THE BOTTOM OF MY HEART\"

## 2025-08-04 RX ORDER — FLUTICASONE PROPIONATE 50 MCG
SPRAY, SUSPENSION (ML) NASAL
Qty: 16 G | Refills: 5 | Status: SHIPPED | OUTPATIENT
Start: 2025-08-04

## 2025-08-11 RX ORDER — CILOSTAZOL 50 MG/1
50 TABLET ORAL 2 TIMES DAILY
Qty: 180 TABLET | Refills: 1 | Status: SHIPPED | OUTPATIENT
Start: 2025-08-11

## 2025-08-12 ENCOUNTER — TELEPHONE (OUTPATIENT)
Dept: PRIMARY CARE CLINIC | Age: 65
End: 2025-08-12

## 2025-08-13 ENCOUNTER — TELEPHONE (OUTPATIENT)
Dept: PRIMARY CARE CLINIC | Age: 65
End: 2025-08-13

## 2025-08-14 DIAGNOSIS — L21.9 SEBORRHEIC DERMATITIS: ICD-10-CM

## 2025-08-15 RX ORDER — KETOCONAZOLE 20 MG/ML
SHAMPOO, SUSPENSION TOPICAL
Qty: 120 ML | Refills: 2 | Status: SHIPPED | OUTPATIENT
Start: 2025-08-15

## 2025-08-15 RX ORDER — ALBUTEROL SULFATE 90 UG/1
INHALANT RESPIRATORY (INHALATION)
Qty: 8.5 G | Refills: 2 | Status: SHIPPED | OUTPATIENT
Start: 2025-08-15

## 2025-08-18 ENCOUNTER — TELEPHONE (OUTPATIENT)
Dept: PRIMARY CARE CLINIC | Age: 65
End: 2025-08-18

## 2025-08-19 ENCOUNTER — HOSPITAL ENCOUNTER (OUTPATIENT)
Age: 65
Setting detail: SPECIMEN
Discharge: HOME | End: 2025-08-19

## 2025-08-19 ENCOUNTER — OFFICE VISIT (OUTPATIENT)
Dept: PRIMARY CARE CLINIC | Age: 65
End: 2025-08-19
Payer: MEDICARE

## 2025-08-19 ENCOUNTER — TELEPHONE (OUTPATIENT)
Dept: PRIMARY CARE CLINIC | Age: 65
End: 2025-08-19

## 2025-08-19 VITALS
SYSTOLIC BLOOD PRESSURE: 104 MMHG | DIASTOLIC BLOOD PRESSURE: 74 MMHG | BODY MASS INDEX: 31.23 KG/M2 | WEIGHT: 199 LBS | HEART RATE: 72 BPM | HEIGHT: 67 IN | OXYGEN SATURATION: 98 %

## 2025-08-19 DIAGNOSIS — E11.51 TYPE 2 DIABETES MELLITUS WITH DIABETIC PERIPHERAL ANGIOPATHY WITHOUT GANGRENE, WITHOUT LONG-TERM CURRENT USE OF INSULIN (HCC): ICD-10-CM

## 2025-08-19 DIAGNOSIS — I73.9 PERIPHERAL ARTERIAL DISEASE: ICD-10-CM

## 2025-08-19 DIAGNOSIS — J43.9 PULMONARY EMPHYSEMA, UNSPECIFIED EMPHYSEMA TYPE (HCC): ICD-10-CM

## 2025-08-19 DIAGNOSIS — G25.0 ESSENTIAL TREMOR: ICD-10-CM

## 2025-08-19 DIAGNOSIS — M79.89 SWELLING OF LEFT INDEX FINGER: ICD-10-CM

## 2025-08-19 DIAGNOSIS — M51.362 DEGENERATION OF INTERVERTEBRAL DISC OF LUMBAR REGION WITH DISCOGENIC BACK PAIN AND LOWER EXTREMITY PAIN: Primary | ICD-10-CM

## 2025-08-19 DIAGNOSIS — K59.04 CHRONIC IDIOPATHIC CONSTIPATION: ICD-10-CM

## 2025-08-19 DIAGNOSIS — M54.16 LUMBAR RADICULOPATHY: ICD-10-CM

## 2025-08-19 LAB
ALBUMIN SERPL-MCNC: 4.3 G/DL (ref 3.5–5.2)
ALBUMIN/GLOB SERPL: 1.8 {RATIO} (ref 1–2.5)
ALP SERPL-CCNC: 55 U/L (ref 40–129)
ALT SERPL-CCNC: 22 U/L (ref 10–50)
ANION GAP SERPL CALCULATED.3IONS-SCNC: 12 MMOL/L (ref 9–16)
AST SERPL-CCNC: 18 U/L (ref 10–50)
BASOPHILS # BLD: 0.05 K/UL (ref 0–0.2)
BASOPHILS NFR BLD: 1 % (ref 0–2)
BILIRUB SERPL-MCNC: 0.4 MG/DL (ref 0–1.2)
BUN SERPL-MCNC: 13 MG/DL (ref 8–23)
CALCIUM SERPL-MCNC: 9.6 MG/DL (ref 8.6–10.4)
CHLORIDE SERPL-SCNC: 98 MMOL/L (ref 98–107)
CO2 SERPL-SCNC: 23 MMOL/L (ref 20–31)
CREAT SERPL-MCNC: 0.6 MG/DL (ref 0.7–1.2)
EOSINOPHIL # BLD: 0.05 K/UL (ref 0–0.44)
EOSINOPHILS RELATIVE PERCENT: 1 % (ref 1–4)
ERYTHROCYTE [DISTWIDTH] IN BLOOD BY AUTOMATED COUNT: 12.4 % (ref 11.8–14.4)
GFR, ESTIMATED: >90 ML/MIN/1.73M2
GLUCOSE SERPL-MCNC: 117 MG/DL (ref 74–99)
HCT VFR BLD AUTO: 38.4 % (ref 40.7–50.3)
HGB BLD-MCNC: 13.4 G/DL (ref 13–17)
IMM GRANULOCYTES # BLD AUTO: 0.03 K/UL (ref 0–0.3)
IMM GRANULOCYTES NFR BLD: 1 %
LYMPHOCYTES NFR BLD: 0.95 K/UL (ref 1.1–3.7)
LYMPHOCYTES RELATIVE PERCENT: 17 % (ref 24–43)
MCH RBC QN AUTO: 31.5 PG (ref 25.2–33.5)
MCHC RBC AUTO-ENTMCNC: 34.9 G/DL (ref 28.4–34.8)
MCV RBC AUTO: 90.4 FL (ref 82.6–102.9)
MONOCYTES NFR BLD: 0.49 K/UL (ref 0.1–1.2)
MONOCYTES NFR BLD: 9 % (ref 3–12)
NEUTROPHILS NFR BLD: 71 % (ref 36–65)
NEUTS SEG NFR BLD: 4.12 K/UL (ref 1.5–8.1)
NRBC BLD-RTO: 0 PER 100 WBC
PLATELET # BLD AUTO: 199 K/UL (ref 138–453)
PMV BLD AUTO: 9.2 FL (ref 8.1–13.5)
POTASSIUM SERPL-SCNC: 4.5 MMOL/L (ref 3.7–5.3)
PROT SERPL-MCNC: 6.7 G/DL (ref 6.6–8.7)
RBC # BLD AUTO: 4.25 M/UL (ref 4.21–5.77)
SODIUM SERPL-SCNC: 133 MMOL/L (ref 136–145)
WBC OTHER # BLD: 5.7 K/UL (ref 3.5–11.3)

## 2025-08-19 PROCEDURE — 3017F COLORECTAL CA SCREEN DOC REV: CPT | Performed by: STUDENT IN AN ORGANIZED HEALTH CARE EDUCATION/TRAINING PROGRAM

## 2025-08-19 PROCEDURE — 1036F TOBACCO NON-USER: CPT | Performed by: STUDENT IN AN ORGANIZED HEALTH CARE EDUCATION/TRAINING PROGRAM

## 2025-08-19 PROCEDURE — 2022F DILAT RTA XM EVC RTNOPTHY: CPT | Performed by: STUDENT IN AN ORGANIZED HEALTH CARE EDUCATION/TRAINING PROGRAM

## 2025-08-19 PROCEDURE — 99214 OFFICE O/P EST MOD 30 MIN: CPT | Performed by: STUDENT IN AN ORGANIZED HEALTH CARE EDUCATION/TRAINING PROGRAM

## 2025-08-19 PROCEDURE — 3023F SPIROM DOC REV: CPT | Performed by: STUDENT IN AN ORGANIZED HEALTH CARE EDUCATION/TRAINING PROGRAM

## 2025-08-19 PROCEDURE — 3044F HG A1C LEVEL LT 7.0%: CPT | Performed by: STUDENT IN AN ORGANIZED HEALTH CARE EDUCATION/TRAINING PROGRAM

## 2025-08-19 PROCEDURE — G8417 CALC BMI ABV UP PARAM F/U: HCPCS | Performed by: STUDENT IN AN ORGANIZED HEALTH CARE EDUCATION/TRAINING PROGRAM

## 2025-08-19 PROCEDURE — 3074F SYST BP LT 130 MM HG: CPT | Performed by: STUDENT IN AN ORGANIZED HEALTH CARE EDUCATION/TRAINING PROGRAM

## 2025-08-19 PROCEDURE — 3078F DIAST BP <80 MM HG: CPT | Performed by: STUDENT IN AN ORGANIZED HEALTH CARE EDUCATION/TRAINING PROGRAM

## 2025-08-19 PROCEDURE — G8427 DOCREV CUR MEDS BY ELIG CLIN: HCPCS | Performed by: STUDENT IN AN ORGANIZED HEALTH CARE EDUCATION/TRAINING PROGRAM

## 2025-08-19 RX ORDER — DIAZEPAM 2 MG/1
TABLET ORAL
Qty: 3 TABLET | Refills: 0 | Status: SHIPPED | OUTPATIENT
Start: 2025-08-19 | End: 2025-09-02

## 2025-08-19 RX ORDER — BISACODYL 10 MG
10 SUPPOSITORY, RECTAL RECTAL DAILY PRN
Qty: 30 SUPPOSITORY | Refills: 1 | Status: CANCELLED | OUTPATIENT
Start: 2025-08-19

## 2025-08-19 RX ORDER — GABAPENTIN 100 MG/1
100 CAPSULE ORAL NIGHTLY
Qty: 30 CAPSULE | Refills: 2 | Status: SHIPPED | OUTPATIENT
Start: 2025-08-19 | End: 2025-11-17

## 2025-08-19 RX ORDER — DOCUSATE SODIUM 100 MG/1
100 CAPSULE, LIQUID FILLED ORAL 2 TIMES DAILY
Qty: 180 CAPSULE | Refills: 3 | Status: SHIPPED | OUTPATIENT
Start: 2025-08-19

## 2025-08-19 RX ORDER — BENZONATATE 100 MG/1
100 CAPSULE ORAL 2 TIMES DAILY PRN
Qty: 30 CAPSULE | Refills: 2 | Status: SHIPPED | OUTPATIENT
Start: 2025-08-19

## 2025-08-19 ASSESSMENT — PATIENT HEALTH QUESTIONNAIRE - PHQ9: DEPRESSION UNABLE TO ASSESS: PT REFUSES

## 2025-08-26 ENCOUNTER — TELEPHONE (OUTPATIENT)
Dept: PRIMARY CARE CLINIC | Age: 65
End: 2025-08-26

## 2025-08-26 DIAGNOSIS — E11.51 TYPE 2 DIABETES MELLITUS WITH DIABETIC PERIPHERAL ANGIOPATHY WITHOUT GANGRENE, WITHOUT LONG-TERM CURRENT USE OF INSULIN (HCC): Primary | ICD-10-CM

## 2025-08-26 RX ORDER — HYDROCHLOROTHIAZIDE 12.5 MG/1
1 CAPSULE ORAL
Qty: 3 EACH | Refills: 0 | Status: CANCELLED | OUTPATIENT
Start: 2025-08-26 | End: 2025-09-25

## 2025-08-27 RX ORDER — BLOOD-GLUCOSE SENSOR
1 EACH MISCELLANEOUS
Qty: 6 EACH | Refills: 3 | Status: SHIPPED | OUTPATIENT
Start: 2025-08-27

## 2025-09-04 ENCOUNTER — TELEPHONE (OUTPATIENT)
Dept: PRIMARY CARE CLINIC | Age: 65
End: 2025-09-04

## 2025-09-04 ENCOUNTER — HOSPITAL ENCOUNTER (OUTPATIENT)
Dept: MRI IMAGING | Age: 65
Discharge: HOME | End: 2025-09-06
Attending: STUDENT IN AN ORGANIZED HEALTH CARE EDUCATION/TRAINING PROGRAM
Payer: MEDICARE

## 2025-09-04 DIAGNOSIS — M51.362 DEGENERATION OF INTERVERTEBRAL DISC OF LUMBAR REGION WITH DISCOGENIC BACK PAIN AND LOWER EXTREMITY PAIN: ICD-10-CM

## 2025-09-04 DIAGNOSIS — M54.16 LUMBAR RADICULOPATHY: ICD-10-CM

## 2025-09-04 PROCEDURE — 72148 MRI LUMBAR SPINE W/O DYE: CPT
